# Patient Record
Sex: MALE | Race: WHITE | Employment: OTHER | URBAN - METROPOLITAN AREA
[De-identification: names, ages, dates, MRNs, and addresses within clinical notes are randomized per-mention and may not be internally consistent; named-entity substitution may affect disease eponyms.]

---

## 2018-02-15 ENCOUNTER — HOSPITAL ENCOUNTER (EMERGENCY)
Age: 43
Discharge: HOME | End: 2018-02-15
Attending: EMERGENCY MEDICINE
Payer: MEDICARE

## 2018-02-15 VITALS
TEMPERATURE: 97.7 F | DIASTOLIC BLOOD PRESSURE: 96 MMHG | SYSTOLIC BLOOD PRESSURE: 159 MMHG | OXYGEN SATURATION: 95 % | HEIGHT: 73 IN | WEIGHT: 167 LBS | RESPIRATION RATE: 18 BRPM | BODY MASS INDEX: 22.13 KG/M2 | HEART RATE: 74 BPM

## 2018-02-15 DIAGNOSIS — K04.7 DENTAL INFECTION: Primary | ICD-10-CM

## 2018-02-15 PROCEDURE — 99282 EMERGENCY DEPT VISIT SF MDM: CPT | Performed by: EMERGENCY MEDICINE

## 2018-02-15 RX ORDER — CLINDAMYCIN HYDROCHLORIDE 150 MG/1
300 CAPSULE ORAL 3 TIMES DAILY
Qty: 42 CAP | Refills: 0 | Status: SHIPPED | OUTPATIENT
Start: 2018-02-15 | End: 2019-07-16

## 2018-02-15 RX ORDER — IBUPROFEN 800 MG/1
800 TABLET ORAL
Qty: 20 TAB | Refills: 0 | Status: SHIPPED | OUTPATIENT
Start: 2018-02-15 | End: 2018-02-22

## 2018-02-15 RX ORDER — HYDROCODONE BITARTRATE AND ACETAMINOPHEN 7.5; 325 MG/1; MG/1
1 TABLET ORAL
Qty: 8 TAB | Refills: 0 | Status: SHIPPED | OUTPATIENT
Start: 2018-02-15 | End: 2019-07-16

## 2018-02-15 NOTE — DISCHARGE INSTRUCTIONS
Cool compresses to swollen, tender area. Take antibiotic until completed. Start both pain medications, Norco for only first 48 hours. Call dentist for recheck. Also consider calling family doctor for recheck. Abscessed Tooth: Care Instructions  Your Care Instructions    An abscessed tooth is a tooth that has a pocket of pus in the tissues around it. Pus forms when the body tries to fight an infection caused by bacteria. If the pus cannot drain, it forms an abscess. An abscessed tooth can cause red, swollen gums and throbbing pain, especially when you chew. You may have a bad taste in your mouth and a fever, and your jaw may swell. Damage to the tooth, untreated tooth decay, or gum disease can cause an abscessed tooth. An abscessed tooth needs to be treated by a dental professional right away. If it is not treated, the infection could spread to other parts of your body. Your dentist will give you antibiotics to stop the infection. He or she may make a hole in the tooth or cut open (izabel) the abscess inside your mouth so that the infection can drain, which should relieve your pain. You may need to have a root canal treatment, which tries to save your tooth by taking out the infected pulp and replacing it with a healing medicine and/or a filling. If these treatments do not work, your tooth may have to be removed. Follow-up care is a key part of your treatment and safety. Be sure to make and go to all appointments, and call your doctor if you are having problems. It's also a good idea to know your test results and keep a list of the medicines you take. How can you care for yourself at home? · Reduce pain and swelling in your face and jaw by putting ice or a cold pack on the outside of your cheek for 10 to 20 minutes at a time. Put a thin cloth between the ice and your skin. · Take pain medicines exactly as directed. ¨ If the doctor gave you a prescription medicine for pain, take it as prescribed.   ¨ If you are not taking a prescription pain medicine, ask your doctor if you can take an over-the-counter medicine. · Take your antibiotics as directed. Do not stop taking them just because you feel better. You need to take the full course of antibiotics. To prevent tooth abscess  · Brush and floss every day, and have regular dental checkups. · Eat a healthy diet, and avoid sugary foods and drinks. · Do not smoke, use e-cigarettes with nicotine, or use spit tobacco. Tobacco and nicotine slow your ability to heal. Tobacco also increases your risk for gum disease and cancer of the mouth and throat. If you need help quitting, talk to your doctor about stop-smoking programs and medicines. These can increase your chances of quitting for good. When should you call for help? Call 911 anytime you think you may need emergency care. For example, call if:  ? · You have trouble breathing. ?Call your doctor now or seek immediate medical care if:  ? · You have new or worse symptoms of infection, such as:  ¨ Increased pain, swelling, warmth, or redness. ¨ Red streaks leading from the area. ¨ Pus draining from the area. ¨ A fever. ? Watch closely for changes in your health, and be sure to contact your doctor if:  ? · You do not get better as expected. Where can you learn more? Go to http://tamara-dago.info/. Enter M667 in the search box to learn more about \"Abscessed Tooth: Care Instructions. \"  Current as of: May 12, 2017  Content Version: 11.4  © 6721-6904 [employer]. Care instructions adapted under license by [employer] (which disclaims liability or warranty for this information). If you have questions about a medical condition or this instruction, always ask your healthcare professional. [place] [address] any warranty or liability for your use of this information.

## 2018-02-15 NOTE — ED TRIAGE NOTES
Pt reports swelling to his right face after taking a remedy for \"pin worms\". Pt crying, agitated, with foul language in triage. Reports that he has been able to \"pass out\" at least 3 times while waiting to be triaged and was able to ambulate to the lobby window to inform registration of that each time.

## 2018-02-15 NOTE — ED PROVIDER NOTES
HPI Comments: 29-year-old male states he had a rash on his neck. Someone told him that a pinworm treatment would work for this. He took a dose of races pinworm treatment yesterday. He woke up with swelling on the right cheek this morning with tenderness and a headache. No fever, vomiting, photophobia, rash. He has no itching or hives. No swelling of throat shortness of breath or difficulty swallowing. No trauma. States he did have part of his tooth fall out yesterday. States that headache was the point where he felt like he was going pass out a number of times. Patient is a 38 y.o. male presenting with facial swelling. The history is provided by the patient. Facial Swelling    The incident occurred yesterday. The volume of blood lost was none. The quality of the pain is described as dull and throbbing. The pain is moderate. Pertinent negatives include no blurred vision, no vomiting and no weakness. Past Medical History:   Diagnosis Date    Chronic obstructive pulmonary disease ([facility] [address]     Endocrine disease     Ill-defined condition     fibromyalgia    Psychiatric disorder        Past Surgical History:   Procedure Laterality Date    HX HEENT      HX ORTHOPAEDIC           History reviewed. No pertinent family history. Social History     Social History    Marital status: SINGLE     Spouse name: N/A    Number of children: N/A    Years of education: N/A     Occupational History    Not on file. Social History Main Topics    Smoking status: Current Every Day Smoker     Packs/day: 0.50    Smokeless tobacco: Never Used    Alcohol use No    Drug use: No    Sexual activity: Not on file     Other Topics Concern    Not on file     Social History Narrative         ALLERGIES: Review of patient's allergies indicates no known allergies. Review of Systems   Constitutional: Negative for chills and fever. HENT: Positive for facial swelling.  Negative for postnasal drip, rhinorrhea, trouble swallowing and voice change. Eyes: Negative for blurred vision. Respiratory: Negative for cough and shortness of breath. Gastrointestinal: Negative for abdominal pain and vomiting. Skin: Negative for color change and rash. Neurological: Positive for dizziness and headaches. Negative for seizures, weakness and light-headedness. Vitals:    02/15/18 1345   BP: (!) 159/96   Pulse: 74   Resp: 18   Temp: 97.7 °F (36.5 °C)   SpO2: 95%   Weight: 75.8 kg (167 lb)   Height: 6' 1\" (1.854 m)            Physical Exam   Constitutional: He appears well-developed and well-nourished. No distress. HENT:   Head: Normocephalic and atraumatic. Right Ear: External ear normal.   Left Ear: External ear normal.   Mouth/Throat:       Neurological:   Normal speech and gait. Neck supple   Skin: Skin is warm and dry. Nursing note and vitals reviewed. MDM  Number of Diagnoses or Management Options  Diagnosis management comments: No evidence meningitis nor subarachnoid hemorrhage. Suspect syncope was vasovagal.  I believe the swelling is due to dental infection. Paresis pinworm treatment is pyrantel. Farris Sis next states that occasionally can cause headaches, pruritic rash.     Risk of Complications, Morbidity, and/or Mortality  Presenting problems: low  Diagnostic procedures: minimal  Management options: low    Patient Progress  Patient progress: stable        ED Course       Procedures

## 2018-02-15 NOTE — ED NOTES
I have reviewed discharge instructions with the patient. The patient verbalized understanding. Patient left ED via Discharge Method: ambulatory to Home with self  . Opportunity for questions and clarification provided. Patient given 3 scripts. To continue your aftercare when you leave the hospital, you may receive an automated call from our care team to check in on how you are doing. This is a free service and part of our promise to provide the best care and service to meet your aftercare needs.  If you have questions, or wish to unsubscribe from this service please call [phone]. Thank you for Choosing our [facility] Emergency Department.

## 2019-07-16 ENCOUNTER — HOSPITAL ENCOUNTER (EMERGENCY)
Age: 44
Discharge: HOME | End: 2019-07-16
Payer: MEDICARE

## 2019-07-16 VITALS
RESPIRATION RATE: 18 BRPM | DIASTOLIC BLOOD PRESSURE: 84 MMHG | HEART RATE: 94 BPM | BODY MASS INDEX: 23.09 KG/M2 | WEIGHT: 175 LBS | TEMPERATURE: 98 F | SYSTOLIC BLOOD PRESSURE: 125 MMHG | OXYGEN SATURATION: 92 %

## 2019-07-16 DIAGNOSIS — Z20.2 POSSIBLE EXPOSURE TO STD: Primary | ICD-10-CM

## 2019-07-16 LAB
BACTERIA URNS QL MICRO: 0 /HPF
CASTS URNS QL MICRO: ABNORMAL /LPF
CRYSTALS URNS QL MICRO: ABNORMAL /LPF
EPI CELLS #/AREA URNS HPF: 0 /HPF
MUCOUS THREADS URNS QL MICRO: ABNORMAL /LPF
RBC #/AREA URNS HPF: 0 /HPF
WBC URNS QL MICRO: >100 /HPF

## 2019-07-16 PROCEDURE — 99283 EMERGENCY DEPT VISIT LOW MDM: CPT

## 2019-07-16 PROCEDURE — 81003 URINALYSIS AUTO W/O SCOPE: CPT

## 2019-07-16 PROCEDURE — 74011250637 HC RX REV CODE- 250/637

## 2019-07-16 PROCEDURE — 96372 THER/PROPH/DIAG INJ SC/IM: CPT

## 2019-07-16 PROCEDURE — 81015 MICROSCOPIC EXAM OF URINE: CPT

## 2019-07-16 PROCEDURE — 74011250636 HC RX REV CODE- 250/636

## 2019-07-16 PROCEDURE — 87491 CHLMYD TRACH DNA AMP PROBE: CPT

## 2019-07-16 RX ORDER — AZITHROMYCIN 250 MG/1
1000 TABLET, FILM COATED ORAL
Status: COMPLETED | OUTPATIENT
Start: 2019-07-16 | End: 2019-07-16

## 2019-07-16 RX ORDER — SULFAMETHOXAZOLE AND TRIMETHOPRIM 800; 160 MG/1; MG/1
1 TABLET ORAL 2 TIMES DAILY
Qty: 14 TAB | Refills: 0 | Status: SHIPPED | OUTPATIENT
Start: 2019-07-16 | End: 2019-07-23

## 2019-07-16 RX ADMIN — AZITHROMYCIN MONOHYDRATE 1000 MG: 250 TABLET ORAL at 02:44

## 2019-07-16 RX ADMIN — LIDOCAINE HYDROCHLORIDE 250 MG: 10 INJECTION, SOLUTION INFILTRATION; PERINEURAL at 02:44

## 2019-07-16 NOTE — ED PROVIDER NOTES
61-year-old male complaining of pain with urination and discharge from his penis. Patient states that he had \"not so safe sex\" last week. The history is provided by the patient. Penile Discharge   This is a new problem. The problem occurs constantly. Primary symptoms include dysuria and penile discharge. The symptoms occur during urination. There has been no fever. Past Medical History:   Diagnosis Date    Chronic obstructive pulmonary disease ([facility] [address]     Endocrine disease     Ill-defined condition     fibromyalgia    Psychiatric disorder        Past Surgical History:   Procedure Laterality Date    HX HEENT      HX ORTHOPAEDIC           History reviewed. No pertinent family history.     Social History     Socioeconomic History    Marital status: SINGLE     Spouse name: Not on file    Number of children: Not on file    Years of education: Not on file    Highest education level: Not on file   Occupational History    Not on file   Social Needs    Financial resource strain: Not on file    Food insecurity:     Worry: Not on file     Inability: Not on file    Transportation needs:     Medical: Not on file     Non-medical: Not on file   Tobacco Use    Smoking status: Current Every Day Smoker     Packs/day: 0.50    Smokeless tobacco: Never Used   Substance and Sexual Activity    Alcohol use: No    Drug use: No    Sexual activity: Not on file   Lifestyle    Physical activity:     Days per week: Not on file     Minutes per session: Not on file    Stress: Not on file   Relationships    Social connections:     Talks on phone: Not on file     Gets together: Not on file     Attends Confucianism service: Not on file     Active member of club or organization: Not on file     Attends meetings of clubs or organizations: Not on file     Relationship status: Not on file    Intimate partner violence:     Fear of current or ex partner: Not on file     Emotionally abused: Not on file     Physically abused: Not on file     Forced sexual activity: Not on file   Other Topics Concern    Not on file   Social History Narrative    Not on file         ALLERGIES: Patient has no known allergies. Review of Systems   Constitutional: Negative. Negative for activity change. HENT: Negative. Eyes: Negative. Respiratory: Negative. Cardiovascular: Negative. Gastrointestinal: Negative. Genitourinary: Positive for dysuria and penile discharge. Musculoskeletal: Negative. Skin: Negative. Neurological: Negative. Psychiatric/Behavioral: Negative. All other systems reviewed and are negative. Vitals:    07/16/19 0054   BP: 125/84   Pulse: 94   Resp: 18   Temp: 98 °F (36.7 °C)   SpO2: 92%   Weight: 79.4 kg (175 lb)            Physical Exam   Constitutional: He is oriented to person, place, and time. He appears well-developed and well-nourished. No distress. HENT:   Head: Normocephalic and atraumatic. Right Ear: External ear normal.   Left Ear: External ear normal.   Nose: Nose normal.   Mouth/Throat: Oropharynx is clear and moist. No oropharyngeal exudate. Eyes: Pupils are equal, round, and reactive to light. Conjunctivae and EOM are normal. Right eye exhibits no discharge. Left eye exhibits no discharge. No scleral icterus. Neck: Normal range of motion. Neck supple. No JVD present. No tracheal deviation present. Cardiovascular: Normal rate and regular rhythm. Pulmonary/Chest: Effort normal. No stridor. No respiratory distress. He has no wheezes. He exhibits no tenderness. Abdominal: He exhibits no distension and no mass. There is no tenderness. Musculoskeletal: Normal range of motion. He exhibits no edema or tenderness. Neurological: He is alert and oriented to person, place, and time. No cranial nerve deficit. Skin: Skin is warm and dry. No rash noted. He is not diaphoretic. No erythema. No pallor. Psychiatric: He has a normal mood and affect.  His behavior is normal. Thought content normal.   Nursing note and vitals reviewed.        MDM  Number of Diagnoses or Management Options  Diagnosis management comments: Assessment: Symptoms of STD with treatment to cover for STD and UTI       Amount and/or Complexity of Data Reviewed  Clinical lab tests: ordered and reviewed  Tests in the medicine section of CPT®: ordered and reviewed           Procedures

## 2019-07-16 NOTE — DISCHARGE INSTRUCTIONS
Patient Education        Learning How to Use a Male Condom  What is a male condom? Condoms can be used to prevent pregnancy. They can also help protect against sexually transmitted infections (STIs). You must use a new condom every time you have sex. Condoms prevent pregnancy by keeping sperm and eggs apart. The condom holds the sperm so the sperm can't get into the vagina. A male condom is a tube of soft rubber or plastic with a closed end. It fits over the penis. There are many kinds of male condoms. Some condoms are lubricated. Some are ribbed. Most have a \"reservoir tip\" for holding the semen. You can also buy condoms of different sizes. How do you use a condom? Condoms work best if you follow these steps. · Use a new condom each time you have sex. · Check the condom's expiration date. Do not use it past that date. · When opening the condom wrapper, be sure not to poke a hole in the condom with your fingernails, teeth, or other sharp objects. · Put the condom on as soon as the penis is hard (erect) and before any sexual contact with your partner. ? First, hold the tip of the condom and squeeze out the air. This leaves room for the semen after you ejaculate. ? If you are not circumcised, pull down the loose skin from the head of the penis (foreskin) before you put on the condom. ? Hold on to the tip of the condom as you unroll the condom. Unroll it all the way down to the base of the penis. · After you ejaculate, hold on to the condom at the base of the penis, and withdraw from your partner while your penis is still erect. This will keep semen from spilling out of the condom. · Wash your hands after you handle a used condom. How do you buy and store condoms? · Male condoms may be available for free at family planning clinics. You can buy them without a prescription at drugstores, online, and in some grocery stores.   · Keep condoms wrapped in their original packages until you are ready to use them. Store them in a cool, dry place out of direct sunlight. · Don't keep rubber (latex) condoms in a glove compartment or other hot places for a long time. Heat weakens latex and increases the chance that the condom will break. · Don't use condoms in damaged packages. And don't use condoms that are brittle, sticky, or discolored, even if they are not past their expiration date. What else do you need to know? · To protect yourself and your partner from STIs, use a condom during vaginal, oral, or anal sex. · If the condom breaks or you think sperm may have leaked out into the vagina, the woman can use emergency contraception, such as the morning-after pill (Plan B). Emergency contraception can be used for up to 5 days after you have had sex. But it works best if you use it right away. · Use a male condom with another form of birth control. It's the best way to prevent pregnancy. ? You can use the condom with hormonal contraception, an intrauterine device (IUD), a diaphragm, a sponge, a shield, or a cervical cap. ? Don't use a male condom with a female condom. ? Use spermicide as its instructions say. Don't put spermicide inside a condom. · If you or your partner gets a rash or feels itchy after using a latex condom, talk to your doctor. You may have a latex allergy. Where can you learn more? Go to http://tamara-dago.info/. Enter R538 in the search box to learn more about \"Learning How to Use a Male Condom. \"  Current as of: September 5, 2018  Content Version: 11.9  © 1114-6085 [facility]. Care instructions adapted under license by [employer] (which disclaims liability or warranty for this information). If you have questions about a medical condition or this instruction, always ask your healthcare professional. [address] any warranty or liability for your use of this information.

## 2019-07-16 NOTE — LETTER
7/22/2019 Juana Wilson [address] [place] [id number] Dear Mr. Grazyna Cooley, You were recently seen in the Emergency Department of [facility] and had blood work or x-rays performed. We would like to discuss these with you. Please call the Emergency Department at your earliest convenience. If you were seen at [facility], please dial [phone], and if you were seen at [facility], please call [phone] to speak with one of our providers. Sincerely, Bert Gallo MD 
Medical Director Emergency Services, [address] [place] [address]  
[phone]/ [phone]

## 2019-07-19 LAB
C TRACH RRNA SPEC QL NAA+PROBE: NEGATIVE
N GONORRHOEA RRNA SPEC QL NAA+PROBE: POSITIVE
SPECIMEN SOURCE: ABNORMAL

## 2019-07-22 NOTE — PROGRESS NOTES
Jennifer Schwab   45 y.o.  [phone] (home)     Pt with positive gonorrhoeae cultures. ED TREATMENT:  Orders Placed This Encounter      CHLAMYDIA / GC-AMPLIFIED      URINE MICROSCOPIC      POC URINE DIPSTICK      cefTRIAXone (ROCEPHIN) 250 mg in lidocaine (XYLOCAINE) 10 mg/mL (1 %) IM syringe      azithromycin (ZITHROMAX) tablet 1,000 mg      trimethoprim-sulfamethoxazole (BACTRIM DS) 160-800 mg per tablet      Patient needs: notification  Patient called. Unable to reach patient. Letter mailed.      FENG Whitten; 7/22/2019 @9:57 AM===========================================

## 2021-09-20 PROBLEM — M25.50 POLYARTHRALGIA: Status: ACTIVE | Noted: 2021-09-20

## 2021-10-11 PROBLEM — F17.200 TOBACCO DEPENDENCE: Status: ACTIVE | Noted: 2021-10-11

## 2021-12-08 PROCEDURE — 88312 SPECIAL STAINS GROUP 1: CPT

## 2021-12-08 PROCEDURE — 88305 TISSUE EXAM BY PATHOLOGIST: CPT

## 2021-12-09 ENCOUNTER — HOSPITAL ENCOUNTER (OUTPATIENT)
Dept: LAB | Age: 46
Discharge: HOME | End: 2021-12-09

## 2022-03-10 ENCOUNTER — HOSPITAL ENCOUNTER (EMERGENCY)
Age: 47
Discharge: HOME | End: 2022-03-10
Attending: EMERGENCY MEDICINE
Payer: MEDICARE

## 2022-03-10 VITALS
HEIGHT: 73 IN | RESPIRATION RATE: 16 BRPM | DIASTOLIC BLOOD PRESSURE: 78 MMHG | SYSTOLIC BLOOD PRESSURE: 140 MMHG | TEMPERATURE: 98 F | OXYGEN SATURATION: 98 % | WEIGHT: 196 LBS | HEART RATE: 86 BPM | BODY MASS INDEX: 25.98 KG/M2

## 2022-03-10 DIAGNOSIS — N34.2 URETHRITIS: Primary | ICD-10-CM

## 2022-03-10 LAB
BACTERIA URNS QL MICRO: 0 /HPF
CASTS URNS QL MICRO: ABNORMAL /LPF
CRYSTALS URNS QL MICRO: ABNORMAL /LPF
EPI CELLS #/AREA URNS HPF: ABNORMAL /HPF
MUCOUS THREADS URNS QL MICRO: ABNORMAL /LPF
OTHER OBSERVATIONS,UCOM: ABNORMAL
RBC #/AREA URNS HPF: ABNORMAL /HPF
WBC URNS QL MICRO: ABNORMAL /HPF

## 2022-03-10 PROCEDURE — 74011250637 HC RX REV CODE- 250/637: Performed by: EMERGENCY MEDICINE

## 2022-03-10 PROCEDURE — 87491 CHLMYD TRACH DNA AMP PROBE: CPT

## 2022-03-10 PROCEDURE — 96372 THER/PROPH/DIAG INJ SC/IM: CPT

## 2022-03-10 PROCEDURE — 74011000250 HC RX REV CODE- 250: Performed by: EMERGENCY MEDICINE

## 2022-03-10 PROCEDURE — 74011250636 HC RX REV CODE- 250/636: Performed by: EMERGENCY MEDICINE

## 2022-03-10 PROCEDURE — 99284 EMERGENCY DEPT VISIT MOD MDM: CPT

## 2022-03-10 PROCEDURE — 81015 MICROSCOPIC EXAM OF URINE: CPT

## 2022-03-10 PROCEDURE — 81003 URINALYSIS AUTO W/O SCOPE: CPT

## 2022-03-10 RX ORDER — PHENAZOPYRIDINE HYDROCHLORIDE 200 MG/1
200 TABLET, FILM COATED ORAL 3 TIMES DAILY
Qty: 6 TABLET | Refills: 0 | Status: SHIPPED | OUTPATIENT
Start: 2022-03-10 | End: 2022-03-12

## 2022-03-10 RX ORDER — DOXYCYCLINE 100 MG/1
100 CAPSULE ORAL
Status: COMPLETED | OUTPATIENT
Start: 2022-03-10 | End: 2022-03-10

## 2022-03-10 RX ORDER — DOXYCYCLINE HYCLATE 100 MG
100 TABLET ORAL 2 TIMES DAILY
Qty: 20 TABLET | Refills: 0 | Status: SHIPPED | OUTPATIENT
Start: 2022-03-10 | End: 2022-03-20

## 2022-03-10 RX ADMIN — DOXYCYCLINE HYCLATE 100 MG: 100 CAPSULE ORAL at 06:11

## 2022-03-10 RX ADMIN — LIDOCAINE HYDROCHLORIDE 500 MG: 10 INJECTION, SOLUTION INFILTRATION; PERINEURAL at 06:14

## 2022-03-10 NOTE — ED NOTES
I have reviewed discharge instructions with the patient. The patient verbalized understanding. Patient left ED via Discharge Method: ambulatory to Home with self. Opportunity for questions and clarification provided. Patient given 2 scripts. No e-sign. To continue your aftercare when you leave the hospital, you may receive an automated call from our care team to check in on how you are doing. This is a free service and part of our promise to provide the best care and service to meet your aftercare needs.  If you have questions, or wish to unsubscribe from this service please call [phone]. Thank you for Choosing our [facility] Emergency Department.

## 2022-03-10 NOTE — ED TRIAGE NOTES
Pt states he has experienced burning upon urination and a foul odor X 1 week and states he thinks he has a STD.

## 2022-03-10 NOTE — DISCHARGE INSTRUCTIONS
You must finish all the prescribed antibiotics. THERE SHOULD BE NO LEFT OVER PILLS!!   Drink plenty of fluids  Call your doctor for close follow-up visit    Return to ER for any worsening symptoms or new problems which may arise

## 2022-03-10 NOTE — ED PROVIDER NOTES
The history is provided by the patient. Urinary Pain   This is a new problem. The current episode started more than 2 days ago. The problem occurs intermittently. The problem has been gradually worsening. The quality of the pain is described as burning. The pain is moderate. There has been no fever. Associated symptoms include chills and urgency. Pertinent negatives include no sweats, no nausea, no vomiting, no flank pain, no penile discharge, no abdominal pain and no back pain. He has tried nothing for the symptoms. His past medical history is significant for urinary stasis.         Past Medical History:   Diagnosis Date    Anxiety     Bell's palsy     Chronic obstructive pulmonary disease (HCC)     Depression     Diverticulosis     Fatigue     GERD (gastroesophageal reflux disease)     Gonorrhea 07/2019    Hemorrhoids     IBS (irritable bowel syndrome)     Ill-defined condition     fibromyalgia    Left ACL tear     Pre-diabetes     Psychiatric disorder     SLAP tear of shoulder     Thyroid disease     s/p partial thyroidectomy for goiter    Tobacco dependence 10/11/2021    Tumors     Vision changes        Past Surgical History:   Procedure Laterality Date    HX ACL RECONSTRUCTION      L shoulder    HX HEENT      HX ORTHOPAEDIC      L shoulder     HX TENDON / LIGAMENT TRANSPLANT      IR BX THYROID NEEDLE CORE  2007    HAS HALF THYROID         Family History:   Problem Relation Age of Onset    Hypertension Mother     Cancer Mother     Kidney Disease Mother     Anxiety Mother     Depression Mother     Heart Disease Mother     No Known Problems Father     Cancer Brother         VARIOUS TUMORS    Diabetes Brother        Social History     Socioeconomic History    Marital status: SINGLE     Spouse name: Not on file    Number of children: Not on file    Years of education: Not on file    Highest education level: Not on file   Occupational History    Not on file   Tobacco Use    Smoking status: Current Every Day Smoker     Packs/day: 0.50    Smokeless tobacco: Never Used   Vaping Use    Vaping Use: Some days   Substance and Sexual Activity    Alcohol use: Not Currently    Drug use: No    Sexual activity: Not on file   Other Topics Concern    Not on file   Social History Narrative    Not on file     Social Determinants of Health     Financial Resource Strain:     Difficulty of Paying Living Expenses: Not on file   Food Insecurity:     Worried About Running Out of Food in the Last Year: Not on file    Eddie of Food in the Last Year: Not on file   Transportation Needs:     Lack of Transportation (Medical): Not on file    Lack of Transportation (Non-Medical): Not on file   Physical Activity:     Days of Exercise per Week: Not on file    Minutes of Exercise per Session: Not on file   Stress:     Feeling of Stress : Not on file   Social Connections:     Frequency of Communication with Friends and Family: Not on file    Frequency of Social Gatherings with Friends and Family: Not on file    Attends [employer] Services: Not on file    Active Member of [address] [place] [address] or Organizations: Not on file    Attends Club or Organization Meetings: Not on file    Marital Status: Not on file   Intimate Partner Violence:     Fear of Current or Ex-Partner: Not on file    Emotionally Abused: Not on file    Physically Abused: Not on file    Sexually Abused: Not on file   Housing Stability:     Unable to Pay for Housing in the Last Year: Not on file    Number of [place] in the Last Year: Not on file    Unstable Housing in the Last Year: Not on file         ALLERGIES: [place] [levonorgestrel-ethinyl estrad]    Review of Systems   Constitutional: Positive for chills. Negative for activity change, diaphoresis and fever. HENT: Negative for dental problem, hearing loss, nosebleeds, rhinorrhea and sore throat. Eyes: Negative for pain, discharge, redness and visual disturbance.    Respiratory: Negative for cough, chest tightness and shortness of breath. Cardiovascular: Negative for chest pain, palpitations and leg swelling. Gastrointestinal: Negative for abdominal pain, constipation, diarrhea, nausea and vomiting. Endocrine: Negative for cold intolerance, heat intolerance, polydipsia and polyuria. Genitourinary: Positive for dysuria and urgency. Negative for flank pain and penile discharge. Musculoskeletal: Negative for arthralgias, back pain, joint swelling, myalgias and neck pain. Skin: Negative for pallor and rash. Allergic/Immunologic: Negative for environmental allergies and food allergies. Neurological: Negative for dizziness, tremors, light-headedness, numbness and headaches. Hematological: Negative for adenopathy. Does not bruise/bleed easily. Psychiatric/Behavioral: Negative for confusion and dysphoric mood. The patient is not nervous/anxious and is not hyperactive. All other systems reviewed and are negative. Vitals:    03/10/22 0550 03/10/22 0553   BP:  (!) 145/91   Pulse: 95    Resp: 16    Temp: 98.1 °F (36.7 °C)    SpO2: 96%    Weight: 88.9 kg (196 lb)    Height: 6' 1\" (1.854 m)             Physical Exam  Vitals and nursing note reviewed. Constitutional:       General: He is in acute distress. Appearance: Normal appearance. He is well-developed and normal weight. HENT:      Head: Normocephalic and atraumatic. Mouth/Throat:      Mouth: Mucous membranes are moist.      Pharynx: Oropharynx is clear. No oropharyngeal exudate or posterior oropharyngeal erythema. Eyes:      General: No scleral icterus. Extraocular Movements: Extraocular movements intact. Conjunctiva/sclera: Conjunctivae normal.      Pupils: Pupils are equal, round, and reactive to light. Neck:      Thyroid: No thyromegaly. Vascular: No JVD. Cardiovascular:      Rate and Rhythm: Normal rate and regular rhythm. Pulses: Normal pulses.       Heart sounds: Normal heart sounds. No murmur heard. No friction rub. No gallop. Pulmonary:      Effort: Pulmonary effort is normal. No respiratory distress. Breath sounds: Normal breath sounds. No wheezing. Abdominal:      General: Bowel sounds are normal. There is no distension. Palpations: Abdomen is soft. Tenderness: There is no right CVA tenderness or left CVA tenderness. Musculoskeletal:         General: No tenderness or deformity. Normal range of motion. Cervical back: Normal range of motion and neck supple. Skin:     General: Skin is warm and dry. Capillary Refill: Capillary refill takes less than 2 seconds. Findings: No rash. Neurological:      Mental Status: He is alert and oriented to person, place, and time. Cranial Nerves: No cranial nerve deficit. Sensory: No sensory deficit. Motor: No abnormal muscle tone. Coordination: Coordination normal.   Psychiatric:         Mood and Affect: Mood normal.         Behavior: Behavior normal.         Thought Content: Thought content normal.         Judgment: Judgment normal.          MDM  Number of Diagnoses or Management Options  Urethritis: new and requires workup  Diagnosis management comments: Urine dip negative for infection or blood    Will cover for possible STD exposure, Rocephin and doxy    Urine GC chlamydia sent    Advise close follow-up with his primary care       Amount and/or Complexity of Data Reviewed  Clinical lab tests: ordered and reviewed  Tests in the medicine section of CPT®: ordered and reviewed  Decide to obtain previous medical records or to obtain history from someone other than the patient: yes  Review and summarize past medical records: yes    Risk of Complications, Morbidity, and/or Mortality  Presenting problems: moderate  Diagnostic procedures: moderate  Management options: moderate  General comments: Elements of this note have been dictated via voice recognition software.   Text and phrases may be limited by the accuracy of the software. The chart has been reviewed, but errors may still be present.       Patient Progress  Patient progress: improved         Procedures

## 2022-03-12 LAB
C TRACH RRNA SPEC QL NAA+PROBE: NEGATIVE
N GONORRHOEA RRNA SPEC QL NAA+PROBE: NEGATIVE
SPECIMEN SOURCE: NORMAL

## 2022-03-17 ENCOUNTER — HOSPITAL ENCOUNTER (OUTPATIENT)
Dept: GENERAL RADIOLOGY | Age: 47
Discharge: HOME | End: 2022-03-17
Attending: INTERNAL MEDICINE
Payer: MEDICARE

## 2022-03-17 DIAGNOSIS — M25.50 POLYARTHRALGIA: ICD-10-CM

## 2022-03-17 PROCEDURE — 72202 X-RAY EXAM SI JOINTS 3/> VWS: CPT

## 2022-03-19 PROBLEM — F17.200 TOBACCO DEPENDENCE: Status: ACTIVE | Noted: 2021-10-11

## 2022-03-19 PROBLEM — M25.50 POLYARTHRALGIA: Status: ACTIVE | Noted: 2021-09-20

## 2022-04-22 PROBLEM — R82.90 ABNORMAL URINE ODOR: Status: ACTIVE | Noted: 2022-04-22

## 2022-04-22 PROBLEM — N39.41 URGENCY INCONTINENCE: Status: ACTIVE | Noted: 2022-04-22

## 2022-04-22 PROBLEM — R82.90 CLOUDY URINE: Status: ACTIVE | Noted: 2022-04-22

## 2022-06-03 ENCOUNTER — OFFICE VISIT (OUTPATIENT)
Dept: INTERNAL MEDICINE CLINIC | Facility: CLINIC | Age: 47
End: 2022-06-03
Payer: MEDICARE

## 2022-06-03 VITALS
WEIGHT: 193.4 LBS | HEART RATE: 83 BPM | TEMPERATURE: 98.7 F | BODY MASS INDEX: 25.63 KG/M2 | HEIGHT: 73 IN | DIASTOLIC BLOOD PRESSURE: 83 MMHG | SYSTOLIC BLOOD PRESSURE: 117 MMHG | OXYGEN SATURATION: 90 %

## 2022-06-03 DIAGNOSIS — N39.41 URGENCY INCONTINENCE: ICD-10-CM

## 2022-06-03 DIAGNOSIS — R39.15 URGENCY OF URINATION: ICD-10-CM

## 2022-06-03 DIAGNOSIS — F41.9 ANXIETY: Primary | ICD-10-CM

## 2022-06-03 DIAGNOSIS — M25.50 POLYARTHRALGIA: ICD-10-CM

## 2022-06-03 DIAGNOSIS — N50.811 PAIN IN RIGHT TESTICLE: ICD-10-CM

## 2022-06-03 DIAGNOSIS — E04.2 MULTIPLE THYROID NODULES: ICD-10-CM

## 2022-06-03 DIAGNOSIS — K21.9 GASTROESOPHAGEAL REFLUX DISEASE WITHOUT ESOPHAGITIS: ICD-10-CM

## 2022-06-03 DIAGNOSIS — N52.9 ERECTILE DYSFUNCTION, UNSPECIFIED ERECTILE DYSFUNCTION TYPE: ICD-10-CM

## 2022-06-03 DIAGNOSIS — R73.03 PREDIABETES: ICD-10-CM

## 2022-06-03 PROBLEM — R82.90 ABNORMAL URINE ODOR: Status: RESOLVED | Noted: 2022-04-22 | Resolved: 2022-06-03

## 2022-06-03 PROBLEM — R82.90 CLOUDY URINE: Status: RESOLVED | Noted: 2022-04-22 | Resolved: 2022-06-03

## 2022-06-03 LAB — PSA SERPL-MCNC: 0.6 NG/ML

## 2022-06-03 PROCEDURE — 99214 OFFICE O/P EST MOD 30 MIN: CPT | Performed by: INTERNAL MEDICINE

## 2022-06-03 RX ORDER — OMEPRAZOLE 40 MG/1
40 CAPSULE, DELAYED RELEASE ORAL DAILY
Qty: 30 CAPSULE | Refills: 2 | Status: SHIPPED | OUTPATIENT
Start: 2022-06-03

## 2022-06-03 RX ORDER — TADALAFIL 10 MG/1
10 TABLET ORAL PRN
Qty: 30 TABLET | Refills: 2 | Status: SHIPPED | OUTPATIENT
Start: 2022-06-03 | End: 2022-07-03

## 2022-06-03 RX ORDER — CITALOPRAM 10 MG/1
10 TABLET ORAL DAILY
COMMUNITY
Start: 2022-04-22 | End: 2022-07-21

## 2022-06-03 ASSESSMENT — PATIENT HEALTH QUESTIONNAIRE - PHQ9
SUM OF ALL RESPONSES TO PHQ QUESTIONS 1-9: 0
1. LITTLE INTEREST OR PLEASURE IN DOING THINGS: 0
2. FEELING DOWN, DEPRESSED OR HOPELESS: 0
SUM OF ALL RESPONSES TO PHQ9 QUESTIONS 1 & 2: 0

## 2022-06-03 ASSESSMENT — ENCOUNTER SYMPTOMS
EYES NEGATIVE: 1
GASTROINTESTINAL NEGATIVE: 1
RESPIRATORY NEGATIVE: 1

## 2022-06-03 NOTE — PROGRESS NOTES
Polly Henley D.O. [facility]  Odalis Diadema [address] [place] [address]  Tel: [phone]    Office Visit: Follow Up     Patient Name: Rico Hughes   :  1975   MRN:   [id number]      Today's Date: 22 8:05 AM    Subjective     The patient is a 41y.o.-year-old male who presents for follow-up. He states he never started his Celexa due reading information about side effects and withdrawal. He states he only started 2 days of varenicline, but will start that soon. Today: He states he is taking OTC erectile dysfunction pills and he has since noticed that he has upset stomach after this. He is requesting a prescription for Cialis. He states he has no new complaints today. Review of Systems   Constitutional: Positive for fatigue. HENT: Negative. Eyes: Negative. Respiratory: Negative. Cardiovascular: Negative. Gastrointestinal: Negative. GERD   Endocrine: Negative. Genitourinary: Positive for frequency, testicular pain and urgency. Musculoskeletal: Positive for arthralgias. Skin: Negative. Neurological: Negative. Psychiatric/Behavioral: The patient is nervous/anxious.        Past Medical History:   Diagnosis Date    Anxiety     Bell's palsy     Chronic obstructive pulmonary disease (HCC)     Depression     Diverticulosis     Fatigue     GERD (gastroesophageal reflux disease)     Gonorrhea 2019    Hemorrhoids     IBS (irritable bowel syndrome)     Ill-defined condition     fibromyalgia    Left ACL tear     Pre-diabetes     Psychiatric disorder     SLAP tear of shoulder     Thyroid disease     s/p partial thyroidectomy for goiter    Tobacco dependence 10/11/2021    Tumors     Vision changes      Social History     Socioeconomic History    Marital status: Single     Spouse name: Not on file    Number of children: Not on file    Years of education: Not on file    Highest education level: Not on file   Occupational History    Not on file   Tobacco Use    Smoking status: Current Every Day Smoker     Packs/day: 0.50    Smokeless tobacco: Never Used   Substance and Sexual Activity    Alcohol use: Not Currently    Drug use: No    Sexual activity: Not on file   Other Topics Concern    Not on file   Social History Narrative    Not on file     Social Determinants of Health     Financial Resource Strain:     Difficulty of Paying Living Expenses: Not on file   Food Insecurity:     Worried About Running Out of Food in the Last Year: Not on file    [place] of Food in the Last Year: Not on file   Transportation Needs:     Lack of Transportation (Medical): Not on file    Lack of Transportation (Non-Medical):  Not on file   Physical Activity:     Days of Exercise per Week: Not on file    Minutes of Exercise per Session: Not on file   Stress:     Feeling of Stress : Not on file   Social Connections:     Frequency of Communication with Friends and Family: Not on file    Frequency of Social Gatherings with Friends and Family: Not on file    Attends [employer] Services: Not on file    Active Member of [address] [place] [address] or Organizations: Not on file    Attends Club or Organization Meetings: Not on file    Marital Status: Not on file   Intimate Partner Violence:     Fear of Current or Ex-Partner: Not on file    Emotionally Abused: Not on file    Physically Abused: Not on file    Sexually Abused: Not on file   Housing Stability:     Unable to Pay for Housing in the Last Year: Not on file    Number of [place] in the Last Year: Not on file    Unstable Housing in the Last Year: Not on file         Current Outpatient Medications   Medication Sig    citalopram (CELEXA) 10 MG tablet Take 10 mg by mouth daily    albuterol sulfate  (90 Base) MCG/ACT inhaler INHALE 1 PUFF BY MOUTH AS NEEDED FOR WHEEZING OR SHORTNESS OF BREATH    aspirin 325 MG tablet Take 325 mg by mouth daily    dicyclomine (BENTYL) 10 MG capsule Take 10 mg by mouth 4 times daily (before meals and nightly)    omeprazole (PRILOSEC) 40 MG delayed release capsule Take 40 mg by mouth daily     No current facility-administered medications for this visit. Objective     Vitals:    06/03/22 0802   BP: 117/83   Site: Right Upper Arm   Position: Sitting   Cuff Size: Large Adult   Pulse: 83   Temp: 98.7 °F (37.1 °C)   TempSrc: Temporal   SpO2: 90%   Weight: 193 lb 6.4 oz (87.7 kg)   Height: 6' 1\" (1.854 m)      Physical Exam:  Constitutional: Appears well kempt. Alert/oriented x3. In no acute distress. Head: Normocephalic No trauma. No deformity. No bruits. Neck: Supple. ROM normal. No tenderness. No masses. Cardiac: Heart with normal rate/rhythm. No murmurs. No gallops. Pulses normal.   Pulmonary: Lungs clear to auscultation bilaterally. In no respiratory distress. No wheezing. No rales. No rhonci. Psychiatric: Normal thought content. Normal behavior. Normal judgment. Recommendations     Assessment:  Patient Active Problem List   Diagnosis    Diverticulosis    Vision changes    Polyarthralgia    Anxiety    GERD (gastroesophageal reflux disease)    Chronic obstructive pulmonary disease (HCC)    Tobacco dependence    Urgency incontinence    Abnormal urine odor    Cloudy urine      Plan:  -Patient will trial Celexa for 4 weeks, we will follow-up in 4 weeks, consider psychiatry referral if patient is amenable  -Refill Omeprazole  -thyroid ultrasound, patient was told at one point a few years ago that he had 7 thyroid nodules and hasn't had them monitor in years due to insurance  -start Cialis   -check PSA, per patient request  -referral to diabetic education   -rheumatology referral  -continue remainder of treatment regimen as is   -The patient expresses understanding of the plan as I've explained it to him and is in agreement with the current plan.     Follow Up: 4 weeks    Time Spent in Patient Room: 15 minutes  Time Spent Pre- and Post Reviewing patient's chart: 15 minutes  Total Time: 30 minutes    Signed: Felicia Agudelo D.O.  06/03/22  8:05 AM

## 2022-06-15 ENCOUNTER — FOLLOWUP TELEPHONE ENCOUNTER (OUTPATIENT)
Dept: DIABETES SERVICES | Age: 47
End: 2022-06-15

## 2022-06-15 NOTE — TELEPHONE ENCOUNTER
Referral received for prediabetes. Pt has [facility]. Called and left message asking for a return call.

## 2022-06-21 ENCOUNTER — TELEPHONE (OUTPATIENT)
Dept: DIABETES SERVICES | Age: 47
End: 2022-06-21

## 2022-06-28 ENCOUNTER — FOLLOWUP TELEPHONE ENCOUNTER (OUTPATIENT)
Dept: DIABETES SERVICES | Age: 47
End: 2022-06-28

## 2022-06-28 NOTE — TELEPHONE ENCOUNTER
Third attempt to contact pt regarding referral for prediabetes. Pt has [employer]. Left message asking for a return call. If we don't hear from pt by 7/5/22 we will send a letter. If we don't hear from pt by 7/19/22 we will close.

## 2022-06-30 ENCOUNTER — TELEPHONE (OUTPATIENT)
Dept: INTERNAL MEDICINE CLINIC | Facility: CLINIC | Age: 47
End: 2022-06-30

## 2022-06-30 DIAGNOSIS — S02.5XXA CLOSED FRACTURE OF TOOTH, INITIAL ENCOUNTER: Primary | ICD-10-CM

## 2022-06-30 RX ORDER — AMOXICILLIN 500 MG/1
500 CAPSULE ORAL 2 TIMES DAILY
Qty: 14 CAPSULE | Refills: 0 | Status: SHIPPED | OUTPATIENT
Start: 2022-06-30 | End: 2022-07-07

## 2022-06-30 NOTE — TELEPHONE ENCOUNTER
[address] called from [address] [place] [address],[address] stating that the patient is needing to know if there can be antibiotics sent in for the patient due to a cracked tooth. Does patient need to be seen?

## 2022-06-30 NOTE — TELEPHONE ENCOUNTER
I have called the patient this afternoon to let him know that he needed to contact is dentist. He stated that he did not have one and that he has contacted two but it was too late in the day to be seen since it would be a consult.  He thanked me for calling and for letting him but stated that Alireza Fabian was unhelpful as usual.

## 2022-07-19 ENCOUNTER — TELEPHONE (OUTPATIENT)
Dept: INTERNAL MEDICINE CLINIC | Facility: CLINIC | Age: 47
End: 2022-07-19

## 2022-07-19 NOTE — TELEPHONE ENCOUNTER
Received fax on behalf of patient from [address] [place] [address] notifying our office that they have attempted several times to contact the patient in regards to the referral that was placed. The patient will need to contact their office, if the patient would like to proceed with the referral.    The referral has been closed by [address] [place] [address].

## 2022-10-07 ENCOUNTER — HOSPITAL ENCOUNTER (EMERGENCY)
Age: 47
Discharge: HOME | End: 2022-10-07
Attending: EMERGENCY MEDICINE
Payer: MEDICARE

## 2022-10-07 VITALS
HEIGHT: 73 IN | OXYGEN SATURATION: 94 % | DIASTOLIC BLOOD PRESSURE: 92 MMHG | HEART RATE: 89 BPM | RESPIRATION RATE: 18 BRPM | TEMPERATURE: 97.7 F | BODY MASS INDEX: 26.51 KG/M2 | SYSTOLIC BLOOD PRESSURE: 143 MMHG | WEIGHT: 200 LBS

## 2022-10-07 DIAGNOSIS — K62.5 RECTAL BLEEDING: Primary | ICD-10-CM

## 2022-10-07 LAB
ALBUMIN SERPL-MCNC: 3.8 G/DL (ref 3.5–5)
ALBUMIN/GLOB SERPL: 0.9 {RATIO} (ref 1.2–3.5)
ALP SERPL-CCNC: 153 U/L (ref 50–136)
ALT SERPL-CCNC: 34 U/L (ref 12–65)
ANION GAP SERPL CALC-SCNC: 4 MMOL/L (ref 4–13)
AST SERPL-CCNC: 16 U/L (ref 15–37)
BASOPHILS # BLD: 0.1 K/UL (ref 0–0.2)
BASOPHILS NFR BLD: 1 % (ref 0–2)
BILIRUB SERPL-MCNC: 0.6 MG/DL (ref 0.2–1.1)
BUN SERPL-MCNC: 16 MG/DL (ref 6–23)
CALCIUM SERPL-MCNC: 9.1 MG/DL (ref 8.3–10.4)
CHLORIDE SERPL-SCNC: 106 MMOL/L (ref 101–110)
CO2 SERPL-SCNC: 27 MMOL/L (ref 21–32)
CREAT SERPL-MCNC: 1.36 MG/DL (ref 0.8–1.5)
DIFFERENTIAL METHOD BLD: NORMAL
EOSINOPHIL # BLD: 0.5 K/UL (ref 0–0.8)
EOSINOPHIL NFR BLD: 5 % (ref 0.5–7.8)
ERYTHROCYTE [DISTWIDTH] IN BLOOD BY AUTOMATED COUNT: 13.2 % (ref 11.9–14.6)
GLOBULIN SER CALC-MCNC: 4.2 G/DL (ref 2.3–3.5)
GLUCOSE SERPL-MCNC: 92 MG/DL (ref 65–100)
HCT VFR BLD AUTO: 47.8 % (ref 41.1–50.3)
HGB BLD-MCNC: 15.4 G/DL (ref 13.6–17.2)
IMM GRANULOCYTES # BLD AUTO: 0 K/UL (ref 0–0.5)
IMM GRANULOCYTES NFR BLD AUTO: 0 % (ref 0–5)
LYMPHOCYTES # BLD: 3.8 K/UL (ref 0.5–4.6)
LYMPHOCYTES NFR BLD: 35 % (ref 13–44)
MCH RBC QN AUTO: 29.8 PG (ref 26.1–32.9)
MCHC RBC AUTO-ENTMCNC: 32.2 G/DL (ref 31.4–35)
MCV RBC AUTO: 92.5 FL (ref 79.6–97.8)
MONOCYTES # BLD: 0.9 K/UL (ref 0.1–1.3)
MONOCYTES NFR BLD: 9 % (ref 4–12)
NEUTS SEG # BLD: 5.4 K/UL (ref 1.7–8.2)
NEUTS SEG NFR BLD: 50 % (ref 43–78)
NRBC # BLD: 0 K/UL (ref 0–0.2)
PLATELET # BLD AUTO: 391 K/UL (ref 150–450)
PMV BLD AUTO: 10.4 FL (ref 9.4–12.3)
POTASSIUM SERPL-SCNC: 4.4 MMOL/L (ref 3.5–5.1)
PROT SERPL-MCNC: 8 G/DL (ref 6.3–8.2)
RBC # BLD AUTO: 5.17 M/UL (ref 4.23–5.6)
SODIUM SERPL-SCNC: 137 MMOL/L (ref 136–145)
WBC # BLD AUTO: 10.8 K/UL (ref 4.3–11.1)

## 2022-10-07 PROCEDURE — 85025 COMPLETE CBC W/AUTO DIFF WBC: CPT

## 2022-10-07 PROCEDURE — A4216 STERILE WATER/SALINE, 10 ML: HCPCS | Performed by: EMERGENCY MEDICINE

## 2022-10-07 PROCEDURE — 2580000003 HC RX 258: Performed by: EMERGENCY MEDICINE

## 2022-10-07 PROCEDURE — 6360000002 HC RX W HCPCS: Performed by: EMERGENCY MEDICINE

## 2022-10-07 PROCEDURE — 99284 EMERGENCY DEPT VISIT MOD MDM: CPT

## 2022-10-07 PROCEDURE — 96374 THER/PROPH/DIAG INJ IV PUSH: CPT

## 2022-10-07 PROCEDURE — C9113 INJ PANTOPRAZOLE SODIUM, VIA: HCPCS | Performed by: EMERGENCY MEDICINE

## 2022-10-07 PROCEDURE — 80053 COMPREHEN METABOLIC PANEL: CPT

## 2022-10-07 RX ORDER — FAMOTIDINE 40 MG/1
20 TABLET, FILM COATED ORAL 2 TIMES DAILY
Qty: 20 TABLET | Refills: 0 | Status: SHIPPED | OUTPATIENT
Start: 2022-10-07 | End: 2022-10-27

## 2022-10-07 RX ADMIN — SODIUM CHLORIDE 40 MG: 9 INJECTION, SOLUTION INTRAMUSCULAR; INTRAVENOUS; SUBCUTANEOUS at 19:46

## 2022-10-07 ASSESSMENT — ENCOUNTER SYMPTOMS
NAUSEA: 0
SHORTNESS OF BREATH: 0
COUGH: 0
HEMATOCHEZIA: 1
CONSTIPATION: 0
BACK PAIN: 0
SORE THROAT: 0
RHINORRHEA: 0
COLOR CHANGE: 0
ABDOMINAL PAIN: 0
DIARRHEA: 0
BLOOD IN STOOL: 1
VOMITING: 0

## 2022-10-07 ASSESSMENT — PAIN DESCRIPTION - PAIN TYPE: TYPE: ACUTE PAIN

## 2022-10-07 ASSESSMENT — PAIN - FUNCTIONAL ASSESSMENT: PAIN_FUNCTIONAL_ASSESSMENT: 0-10

## 2022-10-07 ASSESSMENT — PAIN SCALES - GENERAL: PAINLEVEL_OUTOF10: 1

## 2022-10-07 NOTE — ED TRIAGE NOTES
Patient ambulatory to triage with c/o blood in stool for the past 5 days. Patient states he had a colonoscopy recently that showed 9 polyps as well as currently having hemorrhoids. Patient states he isn't sure if either of these is what is causing his problem.

## 2022-10-07 NOTE — ED PROVIDER NOTES
Emergency Department Provider Note                   PCP:                Angelique Taylor DO               Age: 41 y.o. Sex: male       ICD-10-CM    1. Rectal bleeding  K62.5           DISPOSITION Decision To Discharge 10/07/2022 08:04:46 PM       MDM  Number of Diagnoses or Management Options  Diagnosis management comments: Patient with 5 days of rectal bleeding. Hemoglobin 15. No fatigue shortness of breath or lightheadedness. Will discharge with GI follow-up. Amount and/or Complexity of Data Reviewed  Clinical lab tests: ordered and reviewed    Patient Progress  Patient progress: stable             Orders Placed This Encounter   Procedures    CBC with Auto Differential    Comprehensive Metabolic Panel    Saline lock IV        Medications   pantoprazole (PROTONIX) 40 mg in sodium chloride (PF) 10 mL injection (40 mg IntraVENous Given 10/7/22 1946)       New Prescriptions    ESOMEPRAZOLE (NEXIUM) 20 MG DELAYED RELEASE CAPSULE    Take 1 capsule by mouth every morning (before breakfast)    FAMOTIDINE (PEPCID) 40 MG TABLET    Take 0.5 tablets by mouth 2 times daily for 20 days        Christin Swenson is a 41 y.o. male who presents to the Emergency Department with chief complaint of    Chief Complaint   Patient presents with    Rectal Bleeding      Patient for the past 5 days has had some blood in his stool. Has had similar episodes in the past but normally resolves in a couple days. Denies any fatigue or weakness. No chest pain or shortness of breath. No abdominal pain. No rectal pain. The history is provided by the patient. No  was used. Rectal Bleeding  Quality:  Bright red  Amount:   Moderate  Duration:  5 days  Timing:  Constant  Chronicity:  Recurrent  Similar prior episodes: yes    Relieved by:  Nothing  Worsened by:  Nothing  Associated symptoms: no abdominal pain, no dizziness, no fever, no light-headedness and no vomiting    Risk factors: hx of IBD    Risk factors: no anticoagulant use      All other systems reviewed and are negative unless otherwise stated in the history of present illness section. Review of Systems   Constitutional:  Negative for chills and fever. HENT:  Negative for rhinorrhea and sore throat. Respiratory:  Negative for cough and shortness of breath. Cardiovascular:  Negative for chest pain and palpitations. Gastrointestinal:  Positive for blood in stool and hematochezia. Negative for abdominal pain, constipation, diarrhea, nausea and vomiting. Genitourinary:  Negative for dysuria and hematuria. Musculoskeletal:  Negative for back pain and neck pain. Skin:  Negative for color change and rash. Neurological:  Negative for dizziness, light-headedness, numbness and headaches. All other systems reviewed and are negative.     Past Medical History:   Diagnosis Date    Anxiety     Bell's palsy     Chronic obstructive pulmonary disease (HCC)     Cloudy urine 4/22/2022    Depression     Diverticulosis     Erectile dysfunction 6/3/2022    Fatigue     GERD (gastroesophageal reflux disease)     Gonorrhea 07/2019    Hemorrhoids     IBS (irritable bowel syndrome)     Ill-defined condition     fibromyalgia    Left ACL tear     Pre-diabetes     Psychiatric disorder     SLAP tear of shoulder     Thyroid disease     s/p partial thyroidectomy for goiter    Tobacco dependence 10/11/2021    Tumors     Vision changes         Past Surgical History:   Procedure Laterality Date    ANTERIOR CRUCIATE LIGAMENT REPAIR      L shoulder    HEENT      IR BIOPSY THYROID PERC CORE NEEDLE  2007    HAS HALF THYROID    MASTECTOMY, BILATERAL      ORTHOPEDIC SURGERY      L shoulder         Family History   Problem Relation Age of Onset    Cancer Brother         VARIOUS TUMORS    No Known Problems Father     Heart Disease Mother     Depression Mother     Anxiety Disorder Mother     Cancer Mother     Kidney Disease Mother     Hypertension Mother     Diabetes Brother         Social History     Socioeconomic History    Marital status: Single     Spouse name: None    Number of children: None    Years of education: None    Highest education level: None   Tobacco Use    Smoking status: Every Day     Packs/day: 0.50     Types: Cigarettes    Smokeless tobacco: Never   Substance and Sexual Activity    Alcohol use: Not Currently    Drug use: No        Allergies: Levonorgestrel-ethinyl estrad    Previous Medications    ALBUTEROL SULFATE  (90 BASE) MCG/ACT INHALER    INHALE 1 PUFF BY MOUTH AS NEEDED FOR WHEEZING OR SHORTNESS OF BREATH    ASPIRIN 325 MG TABLET    Take 325 mg by mouth daily    CITALOPRAM (CELEXA) 10 MG TABLET    Take 10 mg by mouth daily    DICYCLOMINE (BENTYL) 10 MG CAPSULE    Take 10 mg by mouth 4 times daily (before meals and nightly)    OMEPRAZOLE (PRILOSEC) 40 MG DELAYED RELEASE CAPSULE    Take 1 capsule by mouth daily    TADALAFIL (CIALIS) 10 MG TABLET    Take 1 tablet by mouth as needed for Erectile Dysfunction        Vitals signs and nursing note reviewed. Patient Vitals for the past 4 hrs:   Temp Pulse Resp BP SpO2   10/07/22 1853 97.7 °F (36.5 °C) 93 18 (!) 130/93 94 %          Physical Exam  Vitals and nursing note reviewed. Constitutional:       Appearance: Normal appearance. HENT:      Head: Normocephalic and atraumatic. Cardiovascular:      Rate and Rhythm: Normal rate and regular rhythm. Pulmonary:      Effort: Pulmonary effort is normal.      Breath sounds: Normal breath sounds. No wheezing. Abdominal:      General: Bowel sounds are normal.      Palpations: Abdomen is soft. Tenderness: There is no abdominal tenderness. Musculoskeletal:         General: No swelling. Normal range of motion. Cervical back: Normal range of motion. No tenderness. Skin:     General: Skin is warm and dry. Neurological:      Mental Status: He is alert.         Procedures      Results for orders placed or performed during the hospital encounter of 10/07/22   CBC with Auto Differential   Result Value Ref Range    WBC 10.8 4.3 - 11.1 K/uL    RBC 5.17 4.23 - 5.6 M/uL    Hemoglobin 15.4 13.6 - 17.2 g/dL    Hematocrit 47.8 41.1 - 50.3 %    MCV 92.5 79.6 - 97.8 FL    MCH 29.8 26.1 - 32.9 PG    MCHC 32.2 31.4 - 35.0 g/dL    RDW 13.2 11.9 - 14.6 %    Platelets 056 643 - 451 K/uL    MPV 10.4 9.4 - 12.3 FL    nRBC 0.00 0.0 - 0.2 K/uL    Differential Type AUTOMATED      Seg Neutrophils 50 43 - 78 %    Lymphocytes 35 13 - 44 %    Monocytes 9 4.0 - 12.0 %    Eosinophils % 5 0.5 - 7.8 %    Basophils 1 0.0 - 2.0 %    Immature Granulocytes 0 0.0 - 5.0 %    Segs Absolute 5.4 1.7 - 8.2 K/UL    Absolute Lymph # 3.8 0.5 - 4.6 K/UL    Absolute Mono # 0.9 0.1 - 1.3 K/UL    Absolute Eos # 0.5 0.0 - 0.8 K/UL    Basophils Absolute 0.1 0.0 - 0.2 K/UL    Absolute Immature Granulocyte 0.0 0.0 - 0.5 K/UL   Comprehensive Metabolic Panel   Result Value Ref Range    Sodium 137 136 - 145 mmol/L    Potassium 4.4 3.5 - 5.1 mmol/L    Chloride 106 101 - 110 mmol/L    CO2 27 21 - 32 mmol/L    Anion Gap 4 4 - 13 mmol/L    Glucose 92 65 - 100 mg/dL    BUN 16 6 - 23 MG/DL    Creatinine 1.36 0.8 - 1.5 MG/DL    Est, Glom Filt Rate >60 >60 ml/min/1.73m2    Calcium 9.1 8.3 - 10.4 MG/DL    Total Bilirubin 0.6 0.2 - 1.1 MG/DL    ALT 34 12 - 65 U/L    AST 16 15 - 37 U/L    Alk Phosphatase 153 (H) 50 - 136 U/L    Total Protein 8.0 6.3 - 8.2 g/dL    Albumin 3.8 3.5 - 5.0 g/dL    Globulin 4.2 (H) 2.3 - 3.5 g/dL    Albumin/Globulin Ratio 0.9 (L) 1.2 - 3.5          No orders to display                         Voice dictation software was used during the making of this note. This software is not perfect and grammatical and other typographical errors may be present. This note has not been completely proofread for errors.      Maurice Asher III, MD  10/07/22 2006

## 2022-10-08 NOTE — ED NOTES
I have reviewed discharge instructions with the patient. The patient verbalized understanding. Patient left ED via Discharge Method: ambulatory to Home with friend. Opportunity for questions and clarification provided. Patient given 2 scripts. To continue your aftercare when you leave the hospital, you may receive an automated call from our care team to check in on how you are doing. This is a free service and part of our promise to provide the best care and service to meet your aftercare needs.  If you have questions, or wish to unsubscribe from this service please call [phone]. Thank you for Choosing our [facility].       Dar Sexton RN  10/07/22 2019

## 2022-10-31 ENCOUNTER — OFFICE VISIT (OUTPATIENT)
Dept: INTERNAL MEDICINE CLINIC | Facility: CLINIC | Age: 47
End: 2022-10-31
Payer: MEDICARE

## 2022-10-31 VITALS
DIASTOLIC BLOOD PRESSURE: 87 MMHG | SYSTOLIC BLOOD PRESSURE: 128 MMHG | TEMPERATURE: 98.2 F | RESPIRATION RATE: 15 BRPM | HEIGHT: 73 IN | WEIGHT: 197.8 LBS | OXYGEN SATURATION: 99 % | BODY MASS INDEX: 26.21 KG/M2 | HEART RATE: 89 BPM

## 2022-10-31 DIAGNOSIS — R49.0 VOICE HOARSENESS: ICD-10-CM

## 2022-10-31 DIAGNOSIS — F17.210 CIGARETTE SMOKER: ICD-10-CM

## 2022-10-31 DIAGNOSIS — R03.0 ELEVATED BLOOD PRESSURE READING: ICD-10-CM

## 2022-10-31 DIAGNOSIS — J02.9 SORE THROAT: ICD-10-CM

## 2022-10-31 DIAGNOSIS — J02.8 ACUTE PHARYNGITIS DUE TO OTHER SPECIFIED ORGANISMS: Primary | ICD-10-CM

## 2022-10-31 PROCEDURE — 99213 OFFICE O/P EST LOW 20 MIN: CPT | Performed by: NURSE PRACTITIONER

## 2022-10-31 RX ORDER — METHYLPREDNISOLONE 4 MG/1
TABLET ORAL
Qty: 1 KIT | Refills: 0 | Status: SHIPPED | OUTPATIENT
Start: 2022-10-31 | End: 2022-11-06

## 2022-10-31 RX ORDER — CEPHALEXIN 500 MG/1
CAPSULE ORAL
Qty: 40 CAPSULE | Refills: 0 | Status: SHIPPED | OUTPATIENT
Start: 2022-10-31

## 2022-10-31 ASSESSMENT — ANXIETY QUESTIONNAIRES
5. BEING SO RESTLESS THAT IT IS HARD TO SIT STILL: 3
6. BECOMING EASILY ANNOYED OR IRRITABLE: 3
2. NOT BEING ABLE TO STOP OR CONTROL WORRYING: 0
IF YOU CHECKED OFF ANY PROBLEMS ON THIS QUESTIONNAIRE, HOW DIFFICULT HAVE THESE PROBLEMS MADE IT FOR YOU TO DO YOUR WORK, TAKE CARE OF THINGS AT HOME, OR GET ALONG WITH OTHER PEOPLE: VERY DIFFICULT
7. FEELING AFRAID AS IF SOMETHING AWFUL MIGHT HAPPEN: 0
1. FEELING NERVOUS, ANXIOUS, OR ON EDGE: 3
4. TROUBLE RELAXING: 3
GAD7 TOTAL SCORE: 15
3. WORRYING TOO MUCH ABOUT DIFFERENT THINGS: 3

## 2022-10-31 ASSESSMENT — PATIENT HEALTH QUESTIONNAIRE - PHQ9
4. FEELING TIRED OR HAVING LITTLE ENERGY: 3
9. THOUGHTS THAT YOU WOULD BE BETTER OFF DEAD, OR OF HURTING YOURSELF: 0
SUM OF ALL RESPONSES TO PHQ QUESTIONS 1-9: 18
SUM OF ALL RESPONSES TO PHQ9 QUESTIONS 1 & 2: 6
SUM OF ALL RESPONSES TO PHQ QUESTIONS 1-9: 18
SUM OF ALL RESPONSES TO PHQ QUESTIONS 1-9: 18
8. MOVING OR SPEAKING SO SLOWLY THAT OTHER PEOPLE COULD HAVE NOTICED. OR THE OPPOSITE, BEING SO FIGETY OR RESTLESS THAT YOU HAVE BEEN MOVING AROUND A LOT MORE THAN USUAL: 3
7. TROUBLE CONCENTRATING ON THINGS, SUCH AS READING THE NEWSPAPER OR WATCHING TELEVISION: 0
2. FEELING DOWN, DEPRESSED OR HOPELESS: 3
6. FEELING BAD ABOUT YOURSELF - OR THAT YOU ARE A FAILURE OR HAVE LET YOURSELF OR YOUR FAMILY DOWN: 0
SUM OF ALL RESPONSES TO PHQ QUESTIONS 1-9: 18
3. TROUBLE FALLING OR STAYING ASLEEP: 3
10. IF YOU CHECKED OFF ANY PROBLEMS, HOW DIFFICULT HAVE THESE PROBLEMS MADE IT FOR YOU TO DO YOUR WORK, TAKE CARE OF THINGS AT HOME, OR GET ALONG WITH OTHER PEOPLE: 2
1. LITTLE INTEREST OR PLEASURE IN DOING THINGS: 3
5. POOR APPETITE OR OVEREATING: 3

## 2022-10-31 ASSESSMENT — ENCOUNTER SYMPTOMS
NAUSEA: 0
BACK PAIN: 0
SORE THROAT: 1
SHORTNESS OF BREATH: 0
CONSTIPATION: 0
VOMITING: 0
EYE PAIN: 0
RHINORRHEA: 0
DIARRHEA: 0
COUGH: 0
ABDOMINAL PAIN: 0
SINUS PAIN: 0

## 2022-10-31 NOTE — PROGRESS NOTES
[address]  Tel# [phone]  Fax# [phone]       Matt Jimenez, [facility]  Family Nurse Practitioner            Date of Visit: 10/31/2022     Edward Hanley (: 1975) is a 41 y.o. male  established patient, here for evaluation of the following chief complaint(s):    Chief Complaint   Patient presents with    Pharyngitis     X 5 days, post nasal drip         Patient Care Team:  Flavia Che DO as PCP - [address] [place] [address], DO as PCP - [facility] Empaneled Provider         History of Present Illness        Same Day Appt, Dr. Jt Madsen pt. Sore Throat  Presents here same day appt with complaint of sore throat that started suddenly 5 days ago. Associated with nasal congestion, hoarseness, left ear congestion. Felt some fatigue this morning. States \"I'm always fatigued\". Has tried salt water rinse. Has not tried any OTC ibuprofen. Denies any fever. Pt states he is villalta and did oral sex  6 days ago. States \"Amoxicillin never worked for Needle HR (hx of frequent Amoxicillin use for dentist appt, hx of heart prolapse). Refused strep test today. Elevated Blood Pressure  Pt states he was told at 4 doctor's appt this year and at the ER that his blood pressure has been high. States he has family hx of HTN. Trying to cut back on smoking. Last smoked cigarette about 1 hour ago. Has been eating out frequently. 6/3/2022 visit with Dr. Jt Madsen: /83.       Patient Active Problem List   Diagnosis    Diverticulosis    Vision changes    Polyarthralgia    Anxiety    GERD (gastroesophageal reflux disease)    Chronic obstructive pulmonary disease (HCC)    Tobacco dependence    Urgency incontinence    Erectile dysfunction    Urgency of urination    Cigarette smoker       Past Medical History:   Diagnosis Date    Anxiety     Bell's palsy     Chronic obstructive pulmonary disease (HCC)     Cloudy urine 2022    Depression     Diverticulosis     Erectile dysfunction 6/3/2022    Fatigue     GERD (gastroesophageal reflux disease)     Gonorrhea 07/2019    Hemorrhoids     IBS (irritable bowel syndrome)     Ill-defined condition     fibromyalgia    Left ACL tear     Pre-diabetes     Psychiatric disorder     SLAP tear of shoulder     Thyroid disease     s/p partial thyroidectomy for goiter    Tobacco dependence 10/11/2021    Tumors     Vision changes        Past Surgical History:   Procedure Laterality Date    ANTERIOR CRUCIATE LIGAMENT REPAIR      L shoulder    HEENT      IR BIOPSY THYROID PERC CORE NEEDLE  2007    HAS HALF THYROID    MASTECTOMY, BILATERAL      ORTHOPEDIC SURGERY      L shoulder        Family History   Problem Relation Age of Onset    Cancer Brother         VARIOUS TUMORS    No Known Problems Father     Heart Disease Mother     Depression Mother     Anxiety Disorder Mother     Cancer Mother     Kidney Disease Mother     Hypertension Mother     Diabetes Brother          ALLERGY  Allergies   Allergen Reactions    Levonorgestrel-Ethinyl Estrad Other (See Comments)     IS SEASONAL ALLERGIES, NOT \"SEASONALE\" MEDICATION!!           Current Outpatient Medications on File Prior to Visit   Medication Sig Dispense Refill    esomeprazole (NEXIUM) 20 MG delayed release capsule Take 1 capsule by mouth every morning (before breakfast) 20 capsule 0    omeprazole (PRILOSEC) 40 MG delayed release capsule Take 1 capsule by mouth daily 30 capsule 2    albuterol sulfate  (90 Base) MCG/ACT inhaler INHALE 1 PUFF BY MOUTH AS NEEDED FOR WHEEZING OR SHORTNESS OF BREATH      dicyclomine (BENTYL) 10 MG capsule Take 10 mg by mouth 4 times daily (before meals and nightly)      famotidine (PEPCID) 40 MG tablet Take 0.5 tablets by mouth 2 times daily for 20 days 20 tablet 0    citalopram (CELEXA) 10 MG tablet Take 10 mg by mouth daily      tadalafil (CIALIS) 10 MG tablet Take 1 tablet by mouth as needed for Erectile Dysfunction 30 tablet 2    aspirin 325 MG tablet Take 325 mg by mouth daily (Patient not taking: Reported on 10/31/2022)       No current facility-administered medications on file prior to visit. Review of Systems  Review of Systems   Constitutional:  Negative for chills, fatigue and fever. HENT:  Positive for congestion (left ear congestion) and sore throat. Negative for postnasal drip, rhinorrhea, sinus pain and sneezing. Eyes:  Negative for pain and visual disturbance. Respiratory:  Negative for cough and shortness of breath. Cardiovascular:  Negative for chest pain, palpitations and leg swelling. Gastrointestinal:  Negative for abdominal pain, constipation, diarrhea, nausea and vomiting. Genitourinary:  Negative for dysuria, frequency and urgency. Musculoskeletal:  Negative for back pain, gait problem and joint swelling. Skin:  Negative for rash and wound. Neurological:  Negative for dizziness and headaches. Psychiatric/Behavioral:  Negative for behavioral problems, sleep disturbance and suicidal ideas. The patient is not nervous/anxious. Vitals:    10/31/22 1449 10/31/22 1519   BP: (!) 153/101 128/87   Site: Left Upper Arm Right Upper Arm   Position: Sitting Sitting   Cuff Size: Large Adult Medium Adult   Pulse: 89    Resp: 15    Temp: 98.2 °F (36.8 °C)    TempSrc: Temporal    SpO2: 99%    Weight: 197 lb 12.8 oz (89.7 kg)    Height: 6' 1.25\" (1.861 m)               Physical Exam  Physical Exam  Constitutional:       General: He is not in acute distress. Appearance: He is not ill-appearing. HENT:      Head: Normocephalic and atraumatic. Right Ear: Tympanic membrane normal.      Left Ear: Tympanic membrane normal.      Mouth/Throat:      Pharynx: Posterior oropharyngeal erythema present. No oropharyngeal exudate (left tonsil). Comments: Left nasal turbinate edematous  Eyes:      Pupils: Pupils are equal, round, and reactive to light. Cardiovascular:      Rate and Rhythm: Normal rate and regular rhythm.    Pulmonary: Effort: Pulmonary effort is normal. No respiratory distress. Breath sounds: Normal breath sounds. Abdominal:      General: Bowel sounds are normal.      Palpations: Abdomen is soft. Musculoskeletal:         General: Normal range of motion. Cervical back: Neck supple. Skin:     General: Skin is warm and dry. Neurological:      General: No focal deficit present. Mental Status: He is alert and oriented to person, place, and time. Psychiatric:         Mood and Affect: Mood normal.         Thought Content: Thought content normal.              Assessment/Plan:          ICD-10-CM    1. Acute pharyngitis due to other specified organisms  J02.8 cephALEXin (KEFLEX) 500 MG capsule    Avoid sharing utensils, change tooth brush. Advised on hand and respiratory hygiene. Advised on fluids, foods rich in Vit C.      2. Sore throat  J02.9 methylPREDNISolone (MEDROL DOSEPACK) 4 MG tablet      3. Elevated blood pressure reading  R03.0       4. Voice hoarseness  R49.0 methylPREDNISolone (MEDROL DOSEPACK) 4 MG tablet      5. Cigarette smoker  F17.210                [facility] was seen today for pharyngitis. Diagnoses and all orders for this visit:    Acute pharyngitis due to other specified organisms  Comments:  Avoid sharing utensils, change tooth brush. Advised on hand and respiratory hygiene. Advised on fluids, foods rich in Vit C. Orders:  -     cephALEXin (KEFLEX) 500 MG capsule; Take 2 caps orally twice a day for 10 days, no refill. Sore throat  -     methylPREDNISolone (MEDROL DOSEPACK) 4 MG tablet; Take by mouth as directed in the pack. Elevated blood pressure reading    Voice hoarseness  -     methylPREDNISolone (MEDROL DOSEPACK) 4 MG tablet; Take by mouth as directed in the pack. Cigarette smoker       Advise to monitor BP at home. States he has a blood pressure monitor at home. Advised on low salt diet. Advised to avoid processed foods such as fast foods, canned foods.  Advised to read food labels. Advised to limit stress or find ways to reduce stress. Advised on physical activity or exercise 3-5 days a week, for at least 30 minutes, as tolerated. Reviewed cardiovascular risks and complication prevention. Advised to seek immediate medical attention (call 911 or present to Emergency Dept) for any chest pains, palpitations or shortness of breath. Advised on smoking cessation. Reviewed CV risks. Advised to follow up with PCP Dr. Danilo Estrada. Follow Up  Return in about 1 week (around 11/7/2022), or if symptoms worsen or fail to improve, for Follow up with PCP Dr. Danilo Estrada.              Guera [facility], DNP, FNP-BC

## 2022-11-09 ENCOUNTER — TELEPHONE (OUTPATIENT)
Dept: INTERNAL MEDICINE CLINIC | Facility: CLINIC | Age: 47
End: 2022-11-09

## 2023-02-03 ENCOUNTER — HOSPITAL ENCOUNTER (OUTPATIENT)
Dept: CT IMAGING | Age: 48
End: 2023-02-03
Payer: MEDICARE

## 2023-02-03 ENCOUNTER — TRANSCRIBE ORDERS (OUTPATIENT)
Dept: SCHEDULING | Age: 48
End: 2023-02-03

## 2023-02-03 DIAGNOSIS — R10.84 GENERALIZED ABDOMINAL PAIN: ICD-10-CM

## 2023-02-03 DIAGNOSIS — R10.814 ABDOMINAL TENDERNESS OF LEFT LOWER QUADRANT, REBOUND TENDERNESS PRESENCE NOT SPECIFIED: ICD-10-CM

## 2023-02-03 DIAGNOSIS — K62.5 RECTAL BLEEDING: ICD-10-CM

## 2023-02-03 DIAGNOSIS — K57.90 DIVERTICULAR DISEASE: ICD-10-CM

## 2023-02-03 DIAGNOSIS — K57.90 DIVERTICULAR DISEASE: Primary | ICD-10-CM

## 2023-02-03 PROCEDURE — 74177 CT ABD & PELVIS W/CONTRAST: CPT

## 2023-02-03 PROCEDURE — 6360000004 HC RX CONTRAST MEDICATION: Performed by: PHYSICIAN ASSISTANT

## 2023-02-03 PROCEDURE — 2580000003 HC RX 258: Performed by: PHYSICIAN ASSISTANT

## 2023-02-03 RX ORDER — SODIUM CHLORIDE 0.9 % (FLUSH) 0.9 %
5-40 SYRINGE (ML) INJECTION 2 TIMES DAILY
Status: DISCONTINUED | OUTPATIENT
Start: 2023-02-03 | End: 2023-02-07 | Stop reason: HOSPADM

## 2023-02-03 RX ORDER — 0.9 % SODIUM CHLORIDE 0.9 %
100 INTRAVENOUS SOLUTION INTRAVENOUS ONCE
Status: DISCONTINUED | OUTPATIENT
Start: 2023-02-03 | End: 2023-02-07 | Stop reason: HOSPADM

## 2023-02-03 RX ADMIN — DIATRIZOATE MEGLUMINE AND DIATRIZOATE SODIUM 15 ML: 660; 100 LIQUID ORAL; RECTAL at 12:19

## 2023-02-03 RX ADMIN — IOPAMIDOL 100 ML: 755 INJECTION, SOLUTION INTRAVENOUS at 12:19

## 2023-02-03 RX ADMIN — SODIUM CHLORIDE, PRESERVATIVE FREE 10 ML: 5 INJECTION INTRAVENOUS at 12:20

## 2023-02-07 ENCOUNTER — OFFICE VISIT (OUTPATIENT)
Dept: INTERNAL MEDICINE CLINIC | Facility: CLINIC | Age: 48
End: 2023-02-07
Payer: MEDICARE

## 2023-02-07 VITALS
BODY MASS INDEX: 26.16 KG/M2 | SYSTOLIC BLOOD PRESSURE: 130 MMHG | WEIGHT: 197.4 LBS | DIASTOLIC BLOOD PRESSURE: 89 MMHG | OXYGEN SATURATION: 97 % | TEMPERATURE: 98.4 F | HEIGHT: 73 IN | RESPIRATION RATE: 12 BRPM | HEART RATE: 76 BPM

## 2023-02-07 DIAGNOSIS — R20.2 NUMBNESS AND TINGLING: ICD-10-CM

## 2023-02-07 DIAGNOSIS — N52.9 ERECTILE DYSFUNCTION, UNSPECIFIED ERECTILE DYSFUNCTION TYPE: ICD-10-CM

## 2023-02-07 DIAGNOSIS — F41.9 ANXIETY: Primary | ICD-10-CM

## 2023-02-07 DIAGNOSIS — N28.1 SIMPLE CYST OF KIDNEY: ICD-10-CM

## 2023-02-07 DIAGNOSIS — Z79.899 OTHER LONG TERM (CURRENT) DRUG THERAPY: ICD-10-CM

## 2023-02-07 DIAGNOSIS — R20.0 NUMBNESS AND TINGLING: ICD-10-CM

## 2023-02-07 DIAGNOSIS — M79.7 FIBROMYALGIA: ICD-10-CM

## 2023-02-07 DIAGNOSIS — M25.50 POLYARTHRALGIA: ICD-10-CM

## 2023-02-07 DIAGNOSIS — R73.09 ELEVATED HEMOGLOBIN A1C: ICD-10-CM

## 2023-02-07 DIAGNOSIS — J43.9 PULMONARY EMPHYSEMA, UNSPECIFIED EMPHYSEMA TYPE (HCC): ICD-10-CM

## 2023-02-07 PROBLEM — N39.41 URGENCY INCONTINENCE: Status: RESOLVED | Noted: 2022-04-22 | Resolved: 2023-02-07

## 2023-02-07 PROBLEM — R39.15 URGENCY OF URINATION: Status: RESOLVED | Noted: 2022-06-03 | Resolved: 2023-02-07

## 2023-02-07 PROCEDURE — 99214 OFFICE O/P EST MOD 30 MIN: CPT | Performed by: INTERNAL MEDICINE

## 2023-02-07 RX ORDER — FLUTICASONE FUROATE, UMECLIDINIUM BROMIDE AND VILANTEROL TRIFENATATE 100; 62.5; 25 UG/1; UG/1; UG/1
1 POWDER RESPIRATORY (INHALATION) DAILY
Qty: 1 EACH | Refills: 3 | Status: SHIPPED | OUTPATIENT
Start: 2023-02-07

## 2023-02-07 RX ORDER — ALBUTEROL SULFATE 90 UG/1
2 AEROSOL, METERED RESPIRATORY (INHALATION) 4 TIMES DAILY
Qty: 18 G | Refills: 1 | Status: SHIPPED | OUTPATIENT
Start: 2023-02-07

## 2023-02-07 RX ORDER — CITALOPRAM 10 MG/1
10 TABLET ORAL DAILY
Qty: 30 TABLET | Refills: 2 | Status: SHIPPED | OUTPATIENT
Start: 2023-02-07 | End: 2023-05-08

## 2023-02-07 RX ORDER — TADALAFIL 10 MG/1
10 TABLET ORAL PRN
Qty: 30 TABLET | Refills: 2 | Status: SHIPPED | OUTPATIENT
Start: 2023-02-07 | End: 2023-03-09

## 2023-02-07 SDOH — ECONOMIC STABILITY: FOOD INSECURITY: WITHIN THE PAST 12 MONTHS, YOU WORRIED THAT YOUR FOOD WOULD RUN OUT BEFORE YOU GOT MONEY TO BUY MORE.: NEVER TRUE

## 2023-02-07 SDOH — ECONOMIC STABILITY: INCOME INSECURITY: HOW HARD IS IT FOR YOU TO PAY FOR THE VERY BASICS LIKE FOOD, HOUSING, MEDICAL CARE, AND HEATING?: NOT HARD AT ALL

## 2023-02-07 SDOH — ECONOMIC STABILITY: FOOD INSECURITY: WITHIN THE PAST 12 MONTHS, THE FOOD YOU BOUGHT JUST DIDN'T LAST AND YOU DIDN'T HAVE MONEY TO GET MORE.: NEVER TRUE

## 2023-02-07 SDOH — ECONOMIC STABILITY: HOUSING INSECURITY
IN THE LAST 12 MONTHS, WAS THERE A TIME WHEN YOU DID NOT HAVE A STEADY PLACE TO SLEEP OR SLEPT IN A SHELTER (INCLUDING NOW)?: NO

## 2023-02-07 ASSESSMENT — PATIENT HEALTH QUESTIONNAIRE - PHQ9
9. THOUGHTS THAT YOU WOULD BE BETTER OFF DEAD, OR OF HURTING YOURSELF: 0
10. IF YOU CHECKED OFF ANY PROBLEMS, HOW DIFFICULT HAVE THESE PROBLEMS MADE IT FOR YOU TO DO YOUR WORK, TAKE CARE OF THINGS AT HOME, OR GET ALONG WITH OTHER PEOPLE: 0
SUM OF ALL RESPONSES TO PHQ QUESTIONS 1-9: 0
7. TROUBLE CONCENTRATING ON THINGS, SUCH AS READING THE NEWSPAPER OR WATCHING TELEVISION: 0
4. FEELING TIRED OR HAVING LITTLE ENERGY: 0
8. MOVING OR SPEAKING SO SLOWLY THAT OTHER PEOPLE COULD HAVE NOTICED. OR THE OPPOSITE, BEING SO FIGETY OR RESTLESS THAT YOU HAVE BEEN MOVING AROUND A LOT MORE THAN USUAL: 0
6. FEELING BAD ABOUT YOURSELF - OR THAT YOU ARE A FAILURE OR HAVE LET YOURSELF OR YOUR FAMILY DOWN: 0
SUM OF ALL RESPONSES TO PHQ QUESTIONS 1-9: 0
3. TROUBLE FALLING OR STAYING ASLEEP: 0
SUM OF ALL RESPONSES TO PHQ QUESTIONS 1-9: 0
SUM OF ALL RESPONSES TO PHQ QUESTIONS 1-9: 0
2. FEELING DOWN, DEPRESSED OR HOPELESS: 0
SUM OF ALL RESPONSES TO PHQ9 QUESTIONS 1 & 2: 0
5. POOR APPETITE OR OVEREATING: 0
1. LITTLE INTEREST OR PLEASURE IN DOING THINGS: 0

## 2023-02-07 ASSESSMENT — ENCOUNTER SYMPTOMS
EYES NEGATIVE: 1
BLOOD IN STOOL: 1

## 2023-02-07 ASSESSMENT — ANXIETY QUESTIONNAIRES
IF YOU CHECKED OFF ANY PROBLEMS ON THIS QUESTIONNAIRE, HOW DIFFICULT HAVE THESE PROBLEMS MADE IT FOR YOU TO DO YOUR WORK, TAKE CARE OF THINGS AT HOME, OR GET ALONG WITH OTHER PEOPLE: NOT DIFFICULT AT ALL
GAD7 TOTAL SCORE: 0
2. NOT BEING ABLE TO STOP OR CONTROL WORRYING: 0
7. FEELING AFRAID AS IF SOMETHING AWFUL MIGHT HAPPEN: 0
1. FEELING NERVOUS, ANXIOUS, OR ON EDGE: 0
3. WORRYING TOO MUCH ABOUT DIFFERENT THINGS: 0
6. BECOMING EASILY ANNOYED OR IRRITABLE: 0
5. BEING SO RESTLESS THAT IT IS HARD TO SIT STILL: 0
4. TROUBLE RELAXING: 0

## 2023-02-07 NOTE — PROGRESS NOTES
Rayna Primrose, D.O. [facility]  [address], [address] [facility] [address]  Tel: [phone]    Office Visit: Follow Up     Patient Name: Arvind Sousa   :  1975   MRN:   [id number]      Today's Date: 23 8:16 AM    Subjective     The patient is a 41y.o.-year-old male who presents for follow-up. Anxiety  -stopped taking Celexa, never really started it, he was nervous about   -he could not take Wellbutrin because it made him mean     Polyarthralgia/joint pain/positive SHAI  -Follows with rheumatology, unremarkable work-up so far    Acid reflux  -Omeprazole 40 mg daily    Erectile dysfunction  -Cialis 10 mg as needed, couldn't afford it     COPD  -On Albuterol inhaler using daily     High risk homosexual behavior    Simple kidney cyst  -2.1 cm, seen on last CT scan from 2/3/2023    Diverticulosis    Small hiatal hernia    Today:   He is here today to review his abdominal CT completed on 2023. Presented to hospital in October for rectal bleeding. He states nothing was done and he is still having rectal bleeding. He states he called his GI doctor at this time and she ordered a CT scan of his abdomen. Bleeding from his rectum stopped yesterday, it was bright red mixed with dark red. He states this was a lot. He states he does not participate in anal sex. He states he also has pain in his right leg whenever he has a BM. He states using the bathroom has been a nightmare because his rectum hurts. He states his left leg goes numb when he is walking, only when he is walking. He states he has no feeling his leg at certain points throughout the day. He states he has no feeling from his left knee to his hip when he is walking. He states this has been going on for 2-3 years. He denies numbness/tingling in his toes/feet. Review of Systems   Constitutional: Negative. HENT: Negative. Eyes: Negative. Cardiovascular: Negative.     Gastrointestinal:  Positive for blood in stool. Endocrine: Negative. Genitourinary: Negative. Musculoskeletal: Negative. Skin: Negative. Neurological: Negative. Hematological: Negative. Psychiatric/Behavioral:  The patient is nervous/anxious.        Past Medical History:   Diagnosis Date    Anxiety     Bell's palsy     Chronic obstructive pulmonary disease (HCC)     Cloudy urine 4/22/2022    Depression     Diverticulosis     Erectile dysfunction 6/3/2022    Fatigue     GERD (gastroesophageal reflux disease)     Gonorrhea 07/2019    Hemorrhoids     IBS (irritable bowel syndrome)     Ill-defined condition     fibromyalgia    Left ACL tear     Pre-diabetes     Psychiatric disorder     SLAP tear of shoulder     Thyroid disease     s/p partial thyroidectomy for goiter    Tobacco dependence 10/11/2021    Tumors     Vision changes      Social History     Socioeconomic History    Marital status: Single     Spouse name: Not on file    Number of children: Not on file    Years of education: Not on file    Highest education level: Not on file   Occupational History    Not on file   Tobacco Use    Smoking status: Every Day     Packs/day: 0.50     Types: Cigarettes    Smokeless tobacco: Never   Substance and Sexual Activity    Alcohol use: Not Currently    Drug use: No    Sexual activity: Not on file   Other Topics Concern    Not on file   Social History Narrative    Not on file     Social Determinants of Health     Financial Resource Strain: Not on file   Food Insecurity: Not on file   Transportation Needs: Not on file   Physical Activity: Not on file   Stress: Not on file   Social Connections: Not on file   Intimate Partner Violence: Not on file   Housing Stability: Not on file         Current Outpatient Medications   Medication Sig    cephALEXin (KEFLEX) 500 MG capsule Take 2 caps orally twice a day for 10 days, no refill.    famotidine (PEPCID) 40 MG tablet Take 0.5 tablets by mouth 2 times daily for 20 days    esomeprazole (NEXIUM) 20 MG delayed release capsule Take 1 capsule by mouth every morning (before breakfast)    citalopram (CELEXA) 10 MG tablet Take 10 mg by mouth daily    omeprazole (PRILOSEC) 40 MG delayed release capsule Take 1 capsule by mouth daily    tadalafil (CIALIS) 10 MG tablet Take 1 tablet by mouth as needed for Erectile Dysfunction    albuterol sulfate  (90 Base) MCG/ACT inhaler INHALE 1 PUFF BY MOUTH AS NEEDED FOR WHEEZING OR SHORTNESS OF BREATH    aspirin 325 MG tablet Take 325 mg by mouth daily (Patient not taking: Reported on 10/31/2022)    dicyclomine (BENTYL) 10 MG capsule Take 10 mg by mouth 4 times daily (before meals and nightly)     No current facility-administered medications for this visit. Objective     There were no vitals filed for this visit. Physical Exam:  Constitutional: Appears well kempt. Alert/oriented x3. In no acute distress. Head: Normocephalic No trauma. No deformity. Cardiac: Heart with normal rate/rhythm. No murmurs. No gallops. Pulses normal.   Pulmonary: Lungs clear to auscultation bilaterally. In no respiratory distress. No wheezing. No rales. No rhonci. Psychiatric: Normal thought content. Normal behavior. Normal judgment. Recommendations     Assessment:  Patient Active Problem List   Diagnosis    Diverticulosis    Vision changes    Polyarthralgia    Anxiety    GERD (gastroesophageal reflux disease)    Chronic obstructive pulmonary disease (HCC)    Tobacco dependence    Urgency incontinence    Erectile dysfunction    Urgency of urination    Cigarette smoker      Status of above conditions: stable    Plan:  Anxiety  -Refill Celexa 10 mg daily    Polyarthralgia/joint pain/positive SHAI  -Patient would like a second opinion rheumatology referral, we gave this to him last visit but he did not attend the visit because his insurance did not cover it. We we will put a referral in again for Dr. Gene Honeycutt.     Acid reflux  -Omeprazole 40 mg daily    Erectile dysfunction  -Cialis 10 mg daily as needed    COPD  -On Albuterol inhaler using daily   -Prescription given for nebulizer machine  -We will also give him Trelegy    High risk homosexual behavior    Simple kidney cyst  -2.1 cm, seen on last CT scan from 2/3/2023  -[facility]  -Urology referral  -Explained to pt that Simple kidney cysts are commonly observed in normal kidneys, with an increasing incidence as individuals age and that the vast [de-identified] of simple kidney cysts require no treatment. Therapy may rarely be required for symptoms, signs, and/or complications (pain, rupture, hemorrhage, et.)    Rectal bleeding, resolved; rectal pain with defecation  -Patient presented to the ER for this in October, he states that this has resolved since but that it was bright red mixed with dark red. He states he has a history of a gastric ulcer. He states he did reach out to GI and they ordered a CT abdomen for him which she just completed a few days ago. It did not show any gastrointestinal abnormalities but he will call his GI doctor today to schedule a follow-up appointment with them to see if he needs a repeat colonoscopy/EGD. Numbness and tingling in his left upper leg  -Nerve conduction study  -Neurology referral    Diverticulosis    -Previous medical records and/or labs/tests available to me reviewed, any records outstanding not available requested  -The risks, benefits, and medical necessity of all medications, tests labs, and any other orders that were ordered at today's visit were discussed with the patient including all elective or patient requested orders. Decision to order or not order tests or based on this risk/benefit ratio and medical necessity.  -The patient expresses understanding of the plan as I've explained it to him and is in agreement with the current plan. Follow Up:      Total Time: 30 minutes (chart reviewing and in-exam time)    Signed: Chantell Joseph D.O.  02/07/23  8:16 AM

## 2023-02-08 ENCOUNTER — TELEPHONE (OUTPATIENT)
Dept: INTERNAL MEDICINE CLINIC | Facility: CLINIC | Age: 48
End: 2023-02-08

## 2023-02-08 LAB
ANION GAP SERPL CALC-SCNC: 7 MMOL/L (ref 2–11)
BUN SERPL-MCNC: 11 MG/DL (ref 6–23)
CALCIUM SERPL-MCNC: 9.3 MG/DL (ref 8.3–10.4)
CHLORIDE SERPL-SCNC: 108 MMOL/L (ref 101–110)
CO2 SERPL-SCNC: 25 MMOL/L (ref 21–32)
CREAT SERPL-MCNC: 1.3 MG/DL (ref 0.8–1.5)
GLUCOSE SERPL-MCNC: 101 MG/DL (ref 65–100)
POTASSIUM SERPL-SCNC: 4.8 MMOL/L (ref 3.5–5.1)
SODIUM SERPL-SCNC: 140 MMOL/L (ref 133–143)

## 2023-02-08 NOTE — TELEPHONE ENCOUNTER
Received fax on behalf of patient from [address] [place] [address] notifying our office that they have attempted several times to contact the patient in regard to the referral that was placed. The patient will need to contact their office, if the patient would like to proceed with the referral.    The referral has been closed by [address] [place] [address].

## 2023-02-20 ENCOUNTER — OFFICE VISIT (OUTPATIENT)
Dept: UROLOGY | Age: 48
End: 2023-02-20
Payer: MEDICARE

## 2023-02-20 DIAGNOSIS — N28.9 RENAL LESION: Primary | ICD-10-CM

## 2023-02-20 DIAGNOSIS — N39.0 RECURRENT UTI: ICD-10-CM

## 2023-02-20 DIAGNOSIS — Z80.42 FAMILY HX OF PROSTATE CANCER: ICD-10-CM

## 2023-02-20 DIAGNOSIS — N52.9 ERECTILE DYSFUNCTION, UNSPECIFIED ERECTILE DYSFUNCTION TYPE: ICD-10-CM

## 2023-02-20 LAB
BILIRUBIN, URINE, POC: NEGATIVE
BLOOD URINE, POC: NEGATIVE
GLUCOSE URINE, POC: NEGATIVE
KETONES, URINE, POC: NEGATIVE
LEUKOCYTE ESTERASE, URINE, POC: ABNORMAL
NITRITE, URINE, POC: NEGATIVE
PH, URINE, POC: 7 (ref 4.6–8)
PROTEIN,URINE, POC: NEGATIVE
PVR, POC: ABNORMAL CC
SPECIFIC GRAVITY, URINE, POC: 1.02 (ref 1–1.03)
URINALYSIS CLARITY, POC: ABNORMAL
URINALYSIS COLOR, POC: ABNORMAL
UROBILINOGEN, POC: ABNORMAL

## 2023-02-20 PROCEDURE — 99204 OFFICE O/P NEW MOD 45 MIN: CPT | Performed by: NURSE PRACTITIONER

## 2023-02-20 PROCEDURE — 81003 URINALYSIS AUTO W/O SCOPE: CPT | Performed by: NURSE PRACTITIONER

## 2023-02-20 PROCEDURE — 51798 US URINE CAPACITY MEASURE: CPT | Performed by: NURSE PRACTITIONER

## 2023-02-20 RX ORDER — TADALAFIL 10 MG/1
10 TABLET ORAL PRN
Qty: 30 TABLET | Refills: 1 | Status: SHIPPED | OUTPATIENT
Start: 2023-02-20

## 2023-02-20 ASSESSMENT — ENCOUNTER SYMPTOMS
EYES NEGATIVE: 1
COUGH: 1
BLOOD IN STOOL: 1
WHEEZING: 1
HEARTBURN: 1
SKIN LESIONS: 1
ABDOMINAL PAIN: 1
INDIGESTION: 1
SHORTNESS OF BREATH: 1
CONSTIPATION: 1
DIARRHEA: 1
BACK PAIN: 1

## 2023-02-20 NOTE — PROGRESS NOTES
[facility]  [address] [facility] [address], [address] [facility]  [phone]          Juan Deras  : 1975    Chief Complaint   Patient presents with    New Patient     SIMPLE RIGHT CYST           HPI     Juan Deras is a 41 y.o. male w hx of IBS being seen today as a new pt for abnormal CT findings. He underwent CT A/P w IV contrast ordered by GI for [facility] revealing a simple cyst to right kidney, shaheed lesions too small to characterize, and diverticulosis. He reports int right abd pain/swelling over several years as well as int UU, UF, and SP pressure. He reports 3-4 UTI over last year. States his urine is dark and thick today. I do not see a positive urine culture on file. There is hx of STD. Pt had a cysto in  in [place] w abnormal emptying per pt. I do not have these records on file. He was prescribed Cilais 10 mg PRN for ED, however was unable to afford this. Has tried \"gas station pills\" which seem to be effective. There is a component of performance anxiety as well. Current smoker. Father had prostate cancer. Lab Results   Component Value Date    PSA 0.6 2022     PVR 0 cc via u/s. Cr 1.30.        Past Medical History:   Diagnosis Date    Anxiety     Bell's palsy     Chronic obstructive pulmonary disease (HCC)     Cloudy urine 2022    Depression     Diverticulosis     Elevated hemoglobin A1c 2023    Erectile dysfunction 6/3/2022    Fatigue     GERD (gastroesophageal reflux disease)     Gonorrhea 2019    Hemorrhoids     IBS (irritable bowel syndrome)     Ill-defined condition     fibromyalgia    Left ACL tear     Pre-diabetes     Psychiatric disorder     SLAP tear of shoulder     Thyroid disease     s/p partial thyroidectomy for goiter    Tobacco dependence 10/11/2021    Tumors     Urgency incontinence 2022    Urgency of urination 6/3/2022    Vision changes      Past Surgical History:   Procedure Laterality Date    ANTERIOR CRUCIATE LIGAMENT REPAIR      L shoulder    HEENT      IR BIOPSY THYROID PERC CORE NEEDLE  2007    HAS HALF THYROID    MASTECTOMY, BILATERAL      ORTHOPEDIC SURGERY      L shoulder      Current Outpatient Medications   Medication Sig Dispense Refill    tadalafil (CIALIS) 10 MG tablet Take 1 tablet by mouth as needed for Erectile Dysfunction 30 tablet 1    albuterol sulfate HFA (PROVENTIL;VENTOLIN;PROAIR) 108 (90 Base) MCG/ACT inhaler Inhale 2 puffs into the lungs 4 times daily INHALE 1 PUFF BY MOUTH AS NEEDED FOR WHEEZING OR SHORTNESS OF BREATH 18 g 1    citalopram (CELEXA) 10 MG tablet Take 1 tablet by mouth daily 30 tablet 2    fluticasone-umeclidin-vilant (TRELEGY ELLIPTA) 100-62.5-25 MCG/ACT AEPB inhaler Inhale 1 puff into the lungs daily 1 each 3    omeprazole (PRILOSEC) 40 MG delayed release capsule Take 1 capsule by mouth daily 30 capsule 2    aspirin 325 MG tablet Take 325 mg by mouth daily (Patient not taking: No sig reported)      dicyclomine (BENTYL) 10 MG capsule Take 10 mg by mouth 4 times daily (before meals and nightly) (Patient not taking: No sig reported)       No current facility-administered medications for this visit. Allergies   Allergen Reactions    Levonorgestrel-Ethinyl Estrad Other (See Comments)     IS SEASONAL ALLERGIES, NOT \"SEASONALE\" MEDICATION!!      Social History     Socioeconomic History    Marital status: Single     Spouse name: Not on file    Number of children: Not on file    Years of education: Not on file    Highest education level: Not on file   Occupational History    Not on file   Tobacco Use    Smoking status: Every Day     Packs/day: 1.00     Years: 33.00     Pack years: 33.00     Types: Cigarettes    Smokeless tobacco: Never    Tobacco comments:     Decreased to 5 cigarettes    Vaping Use    Vaping Use: Some days    Substances: Nicotine, CBD    Devices: Pre-filled pod   Substance and Sexual Activity    Alcohol use: Not Currently    Drug use: No    Sexual activity: Not on file   Other Topics Concern    Not on file   Social History Narrative    Not on file     Social Determinants of Health     Financial Resource Strain: Low Risk     Difficulty of Paying Living Expenses: Not hard at all   Food Insecurity: No Food Insecurity    Worried About [address] in the Last Year: Never true    [address] in the Last Year: Never true   Transportation Needs: Unknown    Lack of Transportation (Medical): Not on file    Lack of Transportation (Non-Medical): No   Physical Activity: Not on file   Stress: Not on file   Social Connections: Not on file   Intimate Partner Violence: Not on file   Housing Stability: Unknown    Unable to Pay for Housing in the Last Year: Not on file    Number of Places Lived in the Last Year: Not on file    Unstable Housing in the Last Year: No     Family History   Problem Relation Age of Onset    Cancer Brother         VARIOUS TUMORS    No Known Problems Father     Heart Disease Mother     Depression Mother     Anxiety Disorder Mother     Cancer Mother     Kidney Disease Mother     Hypertension Mother     Diabetes Brother        Review of Systems  Constitutional: Positive for fever, chills and headaches. Skin: Positive for skin lesions, rash and itching. Eyes: Eyes negative  ENT: Positive for pain swallowing and dry mouth. Respiratory: Positive for cough, shortness of breath and wheezing. Cardiovascular: Positive for chest pain, hypertension, irregular heartbeat, leg pain, leg swelling and regular rate and rhythm. GI: Positive for abdominal pain, blood in stool, constipation, diarrhea, indigestion and heartburn. Genitourinary: Positive for hematuria, recurrent UTIs, nocturia, slower stream, straining, urgency, leakage w/ urge, frequent urination, incomplete emptying, erectile dysfunction, testicular pain and sexually transmitted disease. Musculoskeletal: Positive for back pain, bone pain, arthralgias, tenderness and muscle weakness.   Neurological: Positive for dizziness, focal weakness and numbness.  Psychological: Neg psych ROS  Endocrine: Positive for cold intolerance, thirst, excessive urination, fatigue and heat intolerance.  Hem/Lymphatic: Hematologic/lymphatic negativePositive for easy bruising and frequent infections.    Urinalysis  UA - Dipstick  Results for orders placed or performed in visit on 02/20/23   AMB POC URINALYSIS DIP STICK AUTO W/O MICRO   Result Value Ref Range    Color (UA POC)      Clarity (UA POC)      Glucose, Urine, POC Negative Negative    Bilirubin, Urine, POC Negative Negative    KETONES, Urine, POC Negative Negative    Specific Gravity, Urine, POC 1.020 1.001 - 1.035    Blood (UA POC) Negative Negative    pH, Urine, POC 7.0 4.6 - 8.0    Protein, Urine, POC Negative Negative    Urobilinogen, POC 2 mg/dL     Nitrite, Urine, POC Negative Negative    Leukocyte Esterase, Urine, POC Small Negative       PHYSICAL EXAM    General appearance - well appearing and in no distress  Mental status - alert, oriented to person, place, and time  Neck - supple, no significant adenopathy  Chest/Lung-  Quiet, even and easy respiratory effort without use of accessory muscles  Skin - normal coloration and turgor, no rashes      Assessment and Plan    ICD-10-CM    1. Renal lesion  N28.9 CT ABDOMEN RENAL MASS Additional Contrast? Radiologist Recommendation      2. Erectile dysfunction, unspecified erectile dysfunction type  N52.9 tadalafil (CIALIS) 10 MG tablet      3. Recurrent UTI  N39.0       4. Family hx of prostate cancer  Z80.42       He would like a closer look at renal lesions. Will proceed w CT renal mass protocol. Results will be called.     Will send script to [facility]. Pt should be able to afford w [employer] coupon. Offered referral to sex therapy, however he declines.     Send urine PCR test today. He will be called w results. Will need further chamberlain w true UTI's.     PSA/MARTINA due in June 2023.    Fu pending further testing. To call sooner if needed.     Gifty CARCAMO  Dottie Mcmillan is supervising physician today and he approves plan of care.

## 2023-02-23 ENCOUNTER — TELEPHONE (OUTPATIENT)
Dept: UROLOGY | Age: 48
End: 2023-02-23

## 2023-02-23 NOTE — TELEPHONE ENCOUNTER
Called pt per request of Jerry Esquivel NP. She has sent urine culture to [facility] after pt's visit on 2-20-23. Rep with [facility] called and specimens have not arrived and are considered lost. Surinder Adams would like pt to come in and leave another specimen. Specimen appt made for tomorrow.

## 2023-03-04 ENCOUNTER — APPOINTMENT (OUTPATIENT)
Dept: GENERAL RADIOLOGY | Age: 48
DRG: 683 | End: 2023-03-04
Payer: MEDICARE

## 2023-03-04 ENCOUNTER — HOSPITAL ENCOUNTER (INPATIENT)
Age: 48
LOS: 1 days | Discharge: HOME | DRG: 683 | End: 2023-03-05
Attending: EMERGENCY MEDICINE | Admitting: FAMILY MEDICINE
Payer: MEDICARE

## 2023-03-04 ENCOUNTER — APPOINTMENT (OUTPATIENT)
Dept: CT IMAGING | Age: 48
DRG: 683 | End: 2023-03-04
Payer: MEDICARE

## 2023-03-04 DIAGNOSIS — R55 NEAR SYNCOPE: ICD-10-CM

## 2023-03-04 DIAGNOSIS — R09.02 HYPOXIA: ICD-10-CM

## 2023-03-04 DIAGNOSIS — J44.1 COPD EXACERBATION (HCC): ICD-10-CM

## 2023-03-04 DIAGNOSIS — N17.9 AKI (ACUTE KIDNEY INJURY) (HCC): Primary | ICD-10-CM

## 2023-03-04 DIAGNOSIS — H53.9 TRANSIENT VISION DISTURBANCE: ICD-10-CM

## 2023-03-04 PROBLEM — F17.200 TOBACCO DEPENDENCE: Status: ACTIVE | Noted: 2021-10-11

## 2023-03-04 PROBLEM — I95.9 HYPOTENSION: Status: ACTIVE | Noted: 2023-03-04

## 2023-03-04 PROBLEM — M25.50 POLYARTHRALGIA: Status: ACTIVE | Noted: 2021-09-20

## 2023-03-04 LAB
ALBUMIN SERPL-MCNC: 3.9 G/DL (ref 3.5–5)
ALBUMIN/GLOB SERPL: 1 (ref 0.4–1.6)
ALP SERPL-CCNC: 158 U/L (ref 50–136)
ALT SERPL-CCNC: 40 U/L (ref 12–65)
ANION GAP SERPL CALC-SCNC: 8 MMOL/L (ref 2–11)
APPEARANCE UR: ABNORMAL
AST SERPL-CCNC: 24 U/L (ref 15–37)
BACTERIA URNS QL MICRO: 0 /HPF
BASOPHILS # BLD: 0.1 K/UL (ref 0–0.2)
BASOPHILS NFR BLD: 1 % (ref 0–2)
BILIRUB SERPL-MCNC: 1 MG/DL (ref 0.2–1.1)
BILIRUB UR QL: NEGATIVE
BUN SERPL-MCNC: 14 MG/DL (ref 6–23)
CALCIUM SERPL-MCNC: 9.3 MG/DL (ref 8.3–10.4)
CASTS URNS QL MICRO: ABNORMAL /LPF
CHLORIDE SERPL-SCNC: 105 MMOL/L (ref 101–110)
CO2 SERPL-SCNC: 24 MMOL/L (ref 21–32)
COLOR UR: YELLOW
CREAT SERPL-MCNC: 1.77 MG/DL (ref 0.8–1.5)
CRP SERPL-MCNC: 0.5 MG/DL (ref 0–0.9)
CRYSTALS URNS QL MICRO: ABNORMAL /LPF
D DIMER PPP FEU-MCNC: <0.27 UG/ML(FEU)
DIFFERENTIAL METHOD BLD: NORMAL
EOSINOPHIL # BLD: 0.4 K/UL (ref 0–0.8)
EOSINOPHIL NFR BLD: 4 % (ref 0.5–7.8)
EPI CELLS #/AREA URNS HPF: ABNORMAL /HPF
ERYTHROCYTE [DISTWIDTH] IN BLOOD BY AUTOMATED COUNT: 13.8 % (ref 11.9–14.6)
ERYTHROCYTE [SEDIMENTATION RATE] IN BLOOD: 4 MM/HR (ref 0–20)
GLOBULIN SER CALC-MCNC: 4 G/DL (ref 2.8–4.5)
GLUCOSE SERPL-MCNC: 143 MG/DL (ref 65–100)
GLUCOSE UR STRIP.AUTO-MCNC: NEGATIVE MG/DL
HCT VFR BLD AUTO: 47.7 % (ref 41.1–50.3)
HGB BLD-MCNC: 15.1 G/DL (ref 13.6–17.2)
HGB UR QL STRIP: NEGATIVE
IMM GRANULOCYTES # BLD AUTO: 0.1 K/UL (ref 0–0.5)
IMM GRANULOCYTES NFR BLD AUTO: 1 % (ref 0–5)
KETONES UR QL STRIP.AUTO: NEGATIVE MG/DL
LEUKOCYTE ESTERASE UR QL STRIP.AUTO: ABNORMAL
LYMPHOCYTES # BLD: 3.2 K/UL (ref 0.5–4.6)
LYMPHOCYTES NFR BLD: 32 % (ref 13–44)
MAGNESIUM SERPL-MCNC: 2.4 MG/DL (ref 1.8–2.4)
MCH RBC QN AUTO: 28.9 PG (ref 26.1–32.9)
MCHC RBC AUTO-ENTMCNC: 31.7 G/DL (ref 31.4–35)
MCV RBC AUTO: 91.2 FL (ref 82–102)
MONOCYTES # BLD: 0.6 K/UL (ref 0.1–1.3)
MONOCYTES NFR BLD: 6 % (ref 4–12)
NEUTS SEG # BLD: 5.8 K/UL (ref 1.7–8.2)
NEUTS SEG NFR BLD: 56 % (ref 43–78)
NITRITE UR QL STRIP.AUTO: NEGATIVE
NRBC # BLD: 0 K/UL (ref 0–0.2)
PH UR STRIP: 7 (ref 5–9)
PLATELET # BLD AUTO: 400 K/UL (ref 150–450)
PMV BLD AUTO: 10.2 FL (ref 9.4–12.3)
POTASSIUM SERPL-SCNC: 4.4 MMOL/L (ref 3.5–5.1)
PROT SERPL-MCNC: 7.9 G/DL (ref 6.3–8.2)
PROT UR STRIP-MCNC: 30 MG/DL
RBC # BLD AUTO: 5.23 M/UL (ref 4.23–5.6)
RBC #/AREA URNS HPF: ABNORMAL /HPF
SODIUM SERPL-SCNC: 137 MMOL/L (ref 133–143)
SP GR UR REFRACTOMETRY: 1.04 (ref 1–1.02)
T4 FREE SERPL-MCNC: 1 NG/DL (ref 0.78–1.46)
TROPONIN I SERPL HS-MCNC: 5.2 PG/ML (ref 0–14)
TSH W FREE THYROID IF ABNORMAL: 4.18 UIU/ML (ref 0.36–3.74)
UROBILINOGEN UR QL STRIP.AUTO: 1 EU/DL (ref 0.2–1)
WBC # BLD AUTO: 10.3 K/UL (ref 4.3–11.1)
WBC URNS QL MICRO: ABNORMAL /HPF

## 2023-03-04 PROCEDURE — 96374 THER/PROPH/DIAG INJ IV PUSH: CPT

## 2023-03-04 PROCEDURE — 85025 COMPLETE CBC W/AUTO DIFF WBC: CPT

## 2023-03-04 PROCEDURE — 85652 RBC SED RATE AUTOMATED: CPT

## 2023-03-04 PROCEDURE — 70498 CT ANGIOGRAPHY NECK: CPT

## 2023-03-04 PROCEDURE — 86140 C-REACTIVE PROTEIN: CPT

## 2023-03-04 PROCEDURE — 6370000000 HC RX 637 (ALT 250 FOR IP): Performed by: EMERGENCY MEDICINE

## 2023-03-04 PROCEDURE — 99285 EMERGENCY DEPT VISIT HI MDM: CPT

## 2023-03-04 PROCEDURE — 6360000002 HC RX W HCPCS: Performed by: EMERGENCY MEDICINE

## 2023-03-04 PROCEDURE — 85379 FIBRIN DEGRADATION QUANT: CPT

## 2023-03-04 PROCEDURE — 6360000004 HC RX CONTRAST MEDICATION: Performed by: EMERGENCY MEDICINE

## 2023-03-04 PROCEDURE — 84443 ASSAY THYROID STIM HORMONE: CPT

## 2023-03-04 PROCEDURE — 70450 CT HEAD/BRAIN W/O DYE: CPT

## 2023-03-04 PROCEDURE — 84439 ASSAY OF FREE THYROXINE: CPT

## 2023-03-04 PROCEDURE — 1100000003 HC PRIVATE W/ TELEMETRY

## 2023-03-04 PROCEDURE — 2580000003 HC RX 258: Performed by: EMERGENCY MEDICINE

## 2023-03-04 PROCEDURE — 84484 ASSAY OF TROPONIN QUANT: CPT

## 2023-03-04 PROCEDURE — 93005 ELECTROCARDIOGRAM TRACING: CPT | Performed by: EMERGENCY MEDICINE

## 2023-03-04 PROCEDURE — 84156 ASSAY OF PROTEIN URINE: CPT

## 2023-03-04 PROCEDURE — 81003 URINALYSIS AUTO W/O SCOPE: CPT

## 2023-03-04 PROCEDURE — 71045 X-RAY EXAM CHEST 1 VIEW: CPT

## 2023-03-04 PROCEDURE — 83735 ASSAY OF MAGNESIUM: CPT

## 2023-03-04 PROCEDURE — 94640 AIRWAY INHALATION TREATMENT: CPT

## 2023-03-04 PROCEDURE — 80053 COMPREHEN METABOLIC PANEL: CPT

## 2023-03-04 RX ORDER — ALBUTEROL SULFATE 90 UG/1
2 AEROSOL, METERED RESPIRATORY (INHALATION) 4 TIMES DAILY
Status: DISCONTINUED | OUTPATIENT
Start: 2023-03-05 | End: 2023-03-05 | Stop reason: HOSPADM

## 2023-03-04 RX ORDER — LABETALOL HYDROCHLORIDE 5 MG/ML
10 INJECTION, SOLUTION INTRAVENOUS EVERY 10 MIN PRN
Status: DISCONTINUED | OUTPATIENT
Start: 2023-03-04 | End: 2023-03-05 | Stop reason: HOSPADM

## 2023-03-04 RX ORDER — ACETAMINOPHEN 650 MG/1
650 SUPPOSITORY RECTAL EVERY 4 HOURS PRN
Status: DISCONTINUED | OUTPATIENT
Start: 2023-03-04 | End: 2023-03-05 | Stop reason: HOSPADM

## 2023-03-04 RX ORDER — ASPIRIN 81 MG/1
81 TABLET ORAL DAILY
Status: DISCONTINUED | OUTPATIENT
Start: 2023-03-05 | End: 2023-03-05 | Stop reason: HOSPADM

## 2023-03-04 RX ORDER — CITALOPRAM 10 MG/1
10 TABLET ORAL DAILY
Status: DISCONTINUED | OUTPATIENT
Start: 2023-03-05 | End: 2023-03-04

## 2023-03-04 RX ORDER — ROSUVASTATIN CALCIUM 20 MG/1
40 TABLET, COATED ORAL NIGHTLY
Status: DISCONTINUED | OUTPATIENT
Start: 2023-03-04 | End: 2023-03-05 | Stop reason: HOSPADM

## 2023-03-04 RX ORDER — ASPIRIN 300 MG/1
300 SUPPOSITORY RECTAL DAILY
Status: DISCONTINUED | OUTPATIENT
Start: 2023-03-05 | End: 2023-03-05 | Stop reason: HOSPADM

## 2023-03-04 RX ORDER — 0.9 % SODIUM CHLORIDE 0.9 %
100 INTRAVENOUS SOLUTION INTRAVENOUS
Status: DISCONTINUED | OUTPATIENT
Start: 2023-03-04 | End: 2023-03-05 | Stop reason: HOSPADM

## 2023-03-04 RX ORDER — 0.9 % SODIUM CHLORIDE 0.9 %
1000 INTRAVENOUS SOLUTION INTRAVENOUS ONCE
Status: COMPLETED | OUTPATIENT
Start: 2023-03-04 | End: 2023-03-04

## 2023-03-04 RX ORDER — PANTOPRAZOLE SODIUM 40 MG/1
40 TABLET, DELAYED RELEASE ORAL
Status: DISCONTINUED | OUTPATIENT
Start: 2023-03-05 | End: 2023-03-05 | Stop reason: HOSPADM

## 2023-03-04 RX ORDER — SODIUM CHLORIDE 0.9 % (FLUSH) 0.9 %
10 SYRINGE (ML) INJECTION
Status: DISCONTINUED | OUTPATIENT
Start: 2023-03-04 | End: 2023-03-05 | Stop reason: HOSPADM

## 2023-03-04 RX ORDER — METHYLPREDNISOLONE SODIUM SUCCINATE 125 MG/2ML
125 INJECTION, POWDER, LYOPHILIZED, FOR SOLUTION INTRAMUSCULAR; INTRAVENOUS
Status: COMPLETED | OUTPATIENT
Start: 2023-03-04 | End: 2023-03-04

## 2023-03-04 RX ORDER — POLYETHYLENE GLYCOL 3350 17 G/17G
17 POWDER, FOR SOLUTION ORAL DAILY PRN
Status: DISCONTINUED | OUTPATIENT
Start: 2023-03-04 | End: 2023-03-05 | Stop reason: HOSPADM

## 2023-03-04 RX ORDER — NICOTINE 21 MG/24HR
1 PATCH, TRANSDERMAL 24 HOURS TRANSDERMAL DAILY PRN
Status: DISCONTINUED | OUTPATIENT
Start: 2023-03-04 | End: 2023-03-05 | Stop reason: HOSPADM

## 2023-03-04 RX ORDER — BISACODYL 10 MG
10 SUPPOSITORY, RECTAL RECTAL DAILY PRN
Status: DISCONTINUED | OUTPATIENT
Start: 2023-03-04 | End: 2023-03-05 | Stop reason: HOSPADM

## 2023-03-04 RX ORDER — IPRATROPIUM BROMIDE AND ALBUTEROL SULFATE 2.5; .5 MG/3ML; MG/3ML
1 SOLUTION RESPIRATORY (INHALATION)
Status: COMPLETED | OUTPATIENT
Start: 2023-03-04 | End: 2023-03-04

## 2023-03-04 RX ORDER — ACETAMINOPHEN 325 MG/1
650 TABLET ORAL EVERY 4 HOURS PRN
Status: DISCONTINUED | OUTPATIENT
Start: 2023-03-04 | End: 2023-03-05 | Stop reason: HOSPADM

## 2023-03-04 RX ADMIN — SODIUM CHLORIDE 1000 ML: 9 INJECTION, SOLUTION INTRAVENOUS at 20:46

## 2023-03-04 RX ADMIN — IOPAMIDOL 100 ML: 755 INJECTION, SOLUTION INTRAVENOUS at 19:54

## 2023-03-04 RX ADMIN — IPRATROPIUM BROMIDE AND ALBUTEROL SULFATE 1 AMPULE: 2.5; .5 SOLUTION RESPIRATORY (INHALATION) at 19:30

## 2023-03-04 RX ADMIN — METHYLPREDNISOLONE SODIUM SUCCINATE 125 MG: 125 INJECTION, POWDER, FOR SOLUTION INTRAMUSCULAR; INTRAVENOUS at 19:13

## 2023-03-04 RX ADMIN — SODIUM CHLORIDE 1000 ML: 9 INJECTION, SOLUTION INTRAVENOUS at 19:12

## 2023-03-04 ASSESSMENT — ENCOUNTER SYMPTOMS
BACK PAIN: 0
DIARRHEA: 0
SHORTNESS OF BREATH: 1
NAUSEA: 0
VOMITING: 0
ABDOMINAL PAIN: 0
COUGH: 0
RHINORRHEA: 0

## 2023-03-04 ASSESSMENT — VISUAL ACUITY
OU: 20/40
OS: 20/40
OD: 20/50

## 2023-03-04 ASSESSMENT — LIFESTYLE VARIABLES
HOW OFTEN DO YOU HAVE A DRINK CONTAINING ALCOHOL: NEVER
HOW MANY STANDARD DRINKS CONTAINING ALCOHOL DO YOU HAVE ON A TYPICAL DAY: PATIENT DOES NOT DRINK

## 2023-03-04 ASSESSMENT — PAIN - FUNCTIONAL ASSESSMENT: PAIN_FUNCTIONAL_ASSESSMENT: NONE - DENIES PAIN

## 2023-03-04 NOTE — ED PROVIDER NOTES
Emergency Department Provider Note                   PCP:                Angella Samuels DO               Age: 41 y.o. Sex: male     DISPOSITION Admitted 03/04/2023 09:48:47 PM       ICD-10-CM    1. ROCIO (acute kidney injury) ([facility] [address]  N17.9       2. COPD exacerbation ([facility] [address]  J44.1       3. Transient vision disturbance  H53.9 Transthoracic echocardiogram (TTE) complete with contrast, bubble, strain, and 3D PRN     Transthoracic echocardiogram (TTE) complete with contrast, bubble, strain, and 3D PRN      4. Near syncope  R55       5. Hypoxia  R09.02           MEDICAL DECISION MAKING  Complexity of Problems Addressed:  1 or more chronic illnesses with a severe exacerbation or progression. 1 or more acute illnesses that pose a threat to life or bodily function. Data Reviewed and Analyzed:  Category 1:   I reviewed records from an external source: ED records from outside this hospital.  I reviewed records from an external source: provider visit notes from PCP. I reviewed records from an external source: provider visit notes from outside specialist.  I reviewed records from an external source: previous lab results from outside ED. I reviewed records from an external source: previous imaging studies from outside ED. I ordered each unique test.  I reviewed the results of each unique test.        Category 2:   I independently ordered and reviewed the EKG. I independently ordered and reviewed the X-rays. Chest x-ray with no acute cardiopulmonary process noted. I independently ordered and reviewed the CT Scan. CT head with no acute intracranial abnormality noted. CTA with no LVO or significant stenosis. Category 3: Discussion of management or test interpretation. 62-year-old male with history of COPD, right renal cyst, tobacco dependence, anxiety, GERD who presents status post near syncopal episode after bike riding with a friend.  Patient states that sat down after short bike ride and that \"his vision went white\" with diffuse generalized weakness. EMS reported that patient was hypotensive on scene. Stated they can initially not get a blood pressure on him. Initially would get a blood pressure of 80s over 40s. Patient was also noted to be hypoxic per EMS and was placed on 2 L O2 via nasal cannula. Differential diagnosis includes but is not limited to COPD exacerbation, orthostatic hypotension, dehydration, ROCIO, ACS, pulmonary embolism, UTI, CVA, etc.  ==============================================  On arrival vital signs stable. Afebrile. Normal neuro exam.  No focal deficits. Patient denies any vision loss or blurred vision on arrival.  EKG with normal sinus rhythm. Heart rate 84. No ST elevation. Initial troponin noted to be unremarkable. Chest x-ray with no acute cardiopulmonary process noted. Patient placed on 2 L O2 via nasal cannula which she would not keep in his nostrils and instead put in his mouth. DuoNeb, Solu-Medrol 125 mg IV ordered. 1 L normal saline IV fluid bolus given. Patient noted to have ROCIO with creatinine of 1.77. Patient denies active chest pressure at this time. D-dimer noted to be within normal limits. No indication for CT PE protocol. During ED course, patient with episode of intermittent vision disturbance bilaterally. No facial droop. No slurred speech. No focal deficits noted. Patient describes as \"everyone spaces turn white\". Stat Neurology consult placed. CT head ordered. CT head with no acute intracranial abnormality noted. CTA also obtained with no evidence of LVO or significant stenosis. Patient given additional 1 L normal saline IV fluid bolus. Neuro states that the transient visual disturbance likely due to hypotension/dehydration. No indication for tPA at this time. Neurology recommend admission and MRI, formal inpatient ophthalmology consultation. Patient denies eye pain. Denies tearing, foreign body sensation, ocular pressure.   Order placed for visual acuity to be obtained. Patient states he does wear glasses. Hospitalist consulted for admission. Discussed case with Dr. Aurora Calvert. ED Course as of 03/05/23 0239   Sat Mar 04, 2023   1828 Creatinine(!): 1.77 [DF]   1828 Troponin, High Sensitivity: 5.2 [DF]   1916 Portable Chest X-ray    FINDINGS: The cardiomediastinal contours are within normal limits. There are   surgical clips over the neck. The lungs and pleural spaces are clear. There is no pleural effusion or   pneumothorax. The visualized bones and upper abdomen are unremarkable. IMPRESSION:  1. No acute cardiopulmonary process identified. [DF]   2054 CT head/CTA head and neck    IMPRESSION:  1. No acute intracranial process. MRI is more sensitive for the detection of   acute nonhemorrhagic infarct. 2. No significant stenosis or occlusion of the major arteries of the head and   neck. 3. Emphysema. 4. Right hemithyroidectomy. [DF]      ED Course User Index  [DF] Efrain Damon MD       Risk of Complications and/or Morbidity of Patient Management:  Patient was admitted I have communication with admitting physician and Discussion with external consultants     Junior Fritz is a 41 y.o. male who presents to the Emergency Department with chief complaint of  near syncopal episode, SOB. Chief Complaint   Patient presents with    Shortness of Breath     Near syncopal episode      44-year-old male with history of GERD, COPD, tobacco use, anxiety presents via EMS status post near syncopal sewed after bike riding downtown. States that he had finished a short bike ride and was sitting down with a friend when he had near syncopal state. States that he noticed some substernal chest discomfort. Patient noted to have O2 sats around 90% on room air in route and was placed on 2 L. Patient denies cough, fever, chills, hemoptysis, abdominal pain.   Denies headache, focal weakness, numbness, tingling, slurred speech, seizure-like activity. Patient denies history of CAD, PE. Patient was noted to have initial blood pressure of 80s over 40s on scene with EMS. The history is provided by the patient. No  was used. Review of Systems   Constitutional:  Negative for chills, fatigue and fever. HENT:  Negative for congestion and rhinorrhea. Respiratory:  Positive for shortness of breath. Negative for cough. Cardiovascular:  Positive for chest pain. Negative for palpitations and leg swelling. Gastrointestinal:  Negative for abdominal pain, diarrhea, nausea and vomiting. Genitourinary:  Negative for dysuria and flank pain. Musculoskeletal:  Negative for arthralgias, back pain and myalgias. Skin:  Negative for rash and wound. Neurological:  Positive for syncope and light-headedness. Negative for seizures, facial asymmetry, speech difficulty, weakness, numbness and headaches. Hematological:  Does not bruise/bleed easily. Psychiatric/Behavioral:  Negative for confusion. Vitals signs and nursing note reviewed. Patient Vitals for the past 4 hrs:   Temp Pulse Resp BP SpO2   03/04/23 2300 98.2 °F (36.8 °C) 90 20 (!) 143/92 96 %   03/04/23 2246 98.5 °F (36.9 °C) -- -- -- --          Physical Exam  Vitals and nursing note reviewed. Constitutional:       Appearance: He is well-developed. HENT:      Head: Normocephalic. Eyes:      General: Vision grossly intact. Gaze aligned appropriately. Extraocular Movements: Extraocular movements intact. Pupils: Pupils are equal, round, and reactive to light. Comments: No nystagmus. PERRLA. EOMI. No evidence of afferent pupillary defect noted. Neck:      Vascular: No JVD. Cardiovascular:      Rate and Rhythm: Normal rate. Pulses: Normal pulses. Heart sounds: Normal heart sounds. Pulmonary:      Effort: Pulmonary effort is normal.      Breath sounds: Wheezing present.    Abdominal:      General: Bowel sounds are normal. Palpations: Abdomen is soft. Tenderness: There is no abdominal tenderness. There is no rebound. Comments: Soft, nontender, nondistended. No rebound or guarding. Musculoskeletal:         General: Normal range of motion. Cervical back: Normal range of motion. Right lower leg: No tenderness. No edema. Left lower leg: No tenderness. No edema. Comments: No pitting edema. No calf TTP. Skin:     General: Skin is warm. Findings: No erythema or rash. Neurological:      General: No focal deficit present. Mental Status: He is alert and oriented to person, place, and time. Motor: No weakness. Comments: Strength 5 out of 5 throughout. Normal sensory exam.  No drift. No facial droop. No dysarthria. EKG 12 Lead    Date/Time: 3/5/2023 2:25 AM  Performed by: Sande Prader., MD  Authorized by: Sande Prader., MD     ECG reviewed by ED Physician in the absence of a cardiologist: yes    Rate:     ECG rate:  84    ECG rate assessment: normal    Rhythm:     Rhythm: sinus rhythm    Ectopy:     Ectopy: none    QRS:     QRS axis:  Normal    QRS intervals:  Normal    QRS conduction: normal    ST segments:     ST segments:  Normal  T waves:     T waves: normal       Orders Placed This Encounter   Procedures    XR CHEST PORTABLE    CT HEAD WO CONTRAST    CTA HEAD NECK W WO CONTRAST    MRI brain without contrast    CMP    CBC with Auto Differential    Magnesium    Troponin    D-Dimer, Quantitative    Urinalysis w rflx microscopic    Sedimentation Rate    C-Reactive Protein    TSH with Reflex    Protein / creatinine ratio, urine    T4, Free    CBC    Basic Metabolic Panel w/ Reflex to MG    Hemoglobin A1c    Lipid Panel    Angiotensin Converting Enzyme    Cortisol, Baseline    Anti-DNA Antibody, Double-Stranded    Anti- Smith    Hepatic Function Panel    ADULT DIET;  Regular    Orthostatic Vital Signs    NIH STROKE SCALE ASSESSMENT    Nursing swallow assessment Visual acuity screening    Vital signs per unit routine    Vital signs    Telemetry monitoring - 48 hour duration    Up with assistance    NIHSS/Neuro Checks    Tobacco cessation education    Swallow screen by nursing before diet and oral medications started.     Stroke education    Place intermittent pneumatic compression device    Strict intake and output    Full code    Consult to Physical Medicine Rehab    Inpatient consult to Case Management    Inpatient consult to Ophthalmology    OT eval and treat    PT evaluation and treat    Initiate Oxygen Therapy Protocol    Speech Language Pathology (SLP) eval and treat    EKG 12 Lead    ADMIT TO INPATIENT        Medications   0.9 % sodium chloride bolus (has no administration in time range)   sodium chloride flush 0.9 % injection 10 mL (has no administration in time range)   pantoprazole (PROTONIX) tablet 40 mg (has no administration in time range)   nicotine (NICODERM CQ) 14 MG/24HR 1 patch (has no administration in time range)   acetaminophen (TYLENOL) tablet 650 mg (has no administration in time range)     Or   acetaminophen (TYLENOL) suppository 650 mg (has no administration in time range)   polyethylene glycol (GLYCOLAX) packet 17 g (has no administration in time range)   bisacodyl (DULCOLAX) suppository 10 mg (has no administration in time range)   aspirin EC tablet 81 mg (has no administration in time range)     Or   aspirin suppository 300 mg (has no administration in time range)   labetalol (NORMODYNE;TRANDATE) injection 10 mg (has no administration in time range)   rosuvastatin (CRESTOR) tablet 40 mg (40 mg Oral Given 3/5/23 0013)   fluticasone-umeclidin-vilant (TRELEGY ELLIPTA) 100-62.5-25 MCG/ACT inhaler 1 puff (Patient Supplied) (has no administration in time range)   albuterol sulfate HFA (PROVENTIL;VENTOLIN;PROAIR) 108 (90 Base) MCG/ACT inhaler 2 puff (has no administration in time range)   0.9 % sodium chloride bolus (0 mLs IntraVENous Stopped 3/4/23 2028)   ipratropium-albuterol (DUONEB) nebulizer solution 1 ampule (1 ampule Inhalation Given 3/4/23 1930)   methylPREDNISolone sodium (SOLU-MEDROL) injection 125 mg (125 mg IntraVENous Given 3/4/23 1913)   0.9 % sodium chloride bolus (1,000 mLs IntraVENous New Bag 3/4/23 2046)   iopamidol (ISOVUE-370) 76 % injection 100 mL (100 mLs IntraVENous Given 3/4/23 1954)   calcium carbonate (TUMS) chewable tablet 500 mg (500 mg Oral Given 3/5/23 0013)       Current Discharge Medication List           Past Medical History:   Diagnosis Date    Anxiety     Bell's palsy     Chronic obstructive pulmonary disease ([facility] [address]     Cloudy urine 4/22/2022    Depression     Diverticulosis     Elevated hemoglobin A1c 2/7/2023    Erectile dysfunction 6/3/2022    Fatigue     GERD (gastroesophageal reflux disease)     Gonorrhea 07/2019    Hemorrhoids     IBS (irritable bowel syndrome)     Ill-defined condition     fibromyalgia    Left ACL tear     Pre-diabetes     Psychiatric disorder     SLAP tear of shoulder     Thyroid disease     s/p partial thyroidectomy for goiter    Tobacco dependence 10/11/2021    Tumors     Urgency incontinence 4/22/2022    Urgency of urination 6/3/2022    Vision changes         Past Surgical History:   Procedure Laterality Date    ANTERIOR CRUCIATE LIGAMENT REPAIR      L shoulder    HEENT      IR BIOPSY THYROID PERC CORE NEEDLE  2007    HAS HALF THYROID    MASTECTOMY, BILATERAL      ORTHOPEDIC SURGERY      L shoulder         Family History   Problem Relation Age of Onset    Cancer Brother         VARIOUS TUMORS    No Known Problems Father     Heart Disease Mother     Depression Mother     Anxiety Disorder Mother     Cancer Mother     Kidney Disease Mother     Hypertension Mother     Diabetes Brother         Social History     Socioeconomic History    Marital status: Single   Tobacco Use    Smoking status: Every Day     Packs/day: 1.00     Years: 33.00     Pack years: 33.00     Types: Cigarettes    Smokeless tobacco: Never    Tobacco comments:     Decreased to 5 cigarettes    Vaping Use    Vaping Use: Some days    Substances: Nicotine, CBD    Devices: Pre-filled pod   Substance and Sexual Activity    Alcohol use: Not Currently    Drug use: No     Social Determinants of Health     Financial Resource Strain: Low Risk     Difficulty of Paying Living Expenses: Not hard at all   Food Insecurity: No Food Insecurity    Worried About Running Out of Food in the Last Year: Never true    Ran Out of Food in the Last Year: Never true   Transportation Needs: Unknown    Lack of Transportation (Non-Medical): No   Housing Stability: Unknown    Unstable Housing in the Last Year: No        Allergies: Levonorgestrel-ethinyl estrad    Current Discharge Medication List        CONTINUE these medications which have NOT CHANGED    Details   tadalafil (CIALIS) 10 MG tablet Take 1 tablet by mouth as needed for Erectile Dysfunction  Qty: 30 tablet, Refills: 1    Associated Diagnoses: Erectile dysfunction, unspecified erectile dysfunction type      albuterol sulfate HFA (PROVENTIL;VENTOLIN;PROAIR) 108 (90 Base) MCG/ACT inhaler Inhale 2 puffs into the lungs 4 times daily INHALE 1 PUFF BY MOUTH AS NEEDED FOR WHEEZING OR SHORTNESS OF BREATH  Qty: 18 g, Refills: 1    Associated Diagnoses: Pulmonary emphysema, unspecified emphysema type (HCC)      citalopram (CELEXA) 10 MG tablet Take 1 tablet by mouth daily  Qty: 30 tablet, Refills: 2    Associated Diagnoses: Anxiety; Fibromyalgia      fluticasone-umeclidin-vilant (TRELEGY ELLIPTA) 100-62.5-25 MCG/ACT AEPB inhaler Inhale 1 puff into the lungs daily  Qty: 1 each, Refills: 3    Associated Diagnoses: Pulmonary emphysema, unspecified emphysema type (HCC)      omeprazole (PRILOSEC) 40 MG delayed release capsule Take 1 capsule by mouth daily  Qty: 30 capsule, Refills: 2    Associated Diagnoses: Gastroesophageal reflux disease without esophagitis      aspirin 325 MG tablet Take 325 mg by mouth daily      dicyclomine (BENTYL) 10 MG capsule Take 10 mg by mouth 4 times daily (before meals and nightly)              Results for orders placed or performed during the hospital encounter of 03/04/23   XR CHEST PORTABLE    Narrative    EXAM:  XR CHEST PORTABLE ,  3/4/2023 6:00 PM    INDICATION:  Chest pain. COMPARISON:  None. FINDINGS: The cardiomediastinal contours are within normal limits. There are   surgical clips over the neck. The lungs and pleural spaces are clear. There is no pleural effusion or   pneumothorax. The visualized bones and upper abdomen are unremarkable. Impression    1. No acute cardiopulmonary process identified. This exam was interpreted by a board-certified, body-imaging fellowship trained   radiologist from [address] [place] [address]. If there are any questions regarding   this examination please feel free to contact a radiologist at [phone]. [address]     Armin Hernandez   3/4/2023 6:32:00 PM   CT HEAD WO CONTRAST    Narrative    CT HEAD WITHOUT CONTRAST. CTA HEAD WITH CONTRAST. CTA NECK WITH CONTRAST. DATE: 3/4/2023 7:43 PM     INDICATION:  Intermittent vision loss. Dizziness     COMPARISON: None. TECHNIQUE:  Axial images through the head without contrast. Thin section axial   images through the head and neck after IV contrast. Two dimensional and three   dimensional reformations. NASCET criteria used to determine stenosis. 100 mL   Isovue 370 given IV. Low-dose CT acquisition technique included one of the   following options: 1. Automated exposure control, 2. Adjustment of mA and/or KV   according to patient's size and/or 3. Use of iterative reconstruction. FINDINGS:      CT HEAD:    The ventricles and cisternal spaces are normal in size, shape and configuration   for a patient of this age. Trevor Dunks white differentiation is preserved. No dominant   mass, midline shift, hydrocephalus, extra-axial fluid collection or acute   hemorrhage.       No asymmetric hyperdensity of the proximal major cerebral arteries. Craniocervical junction is patent. Mild mucosal thickening ethmoid air cells, maxillary sinuses. Otherwise,   Visualized paranasal sinuses and mastoid air cells are clear. Skull is intact. Visualized intraorbital contents are unremarkable. CTA NECK:    Aortic arch: Normal triple vessel anatomy. Right common carotid artery: Normal origin. No significant stenosis, occlusion. Right internal carotid artery: No significant stenosis, occlusion. Right vertebral artery: Normal origin. No significant stenosis, occlusion. Left common carotid artery: Normal origin. No significant stenosis, occlusion. Left internal carotid artery: No significant stenosis, occlusion. Left vertebral artery: Normal origin. No significant stenosis, occlusion. Parotid and submandibular glands are intact. Posterior nasopharynx, oropharynx,   hypopharynx, larynx, proximal trachea, imaged superior mediastinum are patent. Lymph nodes in the neck do not meet the CT size criteria for lymphadenopathy. Degenerative changes of the spine. Mild centrilobular and paraseptal emphysema   in the lung apices. Dependent atelectasis in the lung apices. Right   hemithyroidectomy. CTA HEAD:    No large aneurysm, significant stenosis or occlusion of the proximal major   cerebral arteries. Major dural venous sinuses are patent. Impression    1. No acute intracranial process. MRI is more sensitive for the detection of   acute nonhemorrhagic infarct. 2. No significant stenosis or occlusion of the major arteries of the head and   neck. 3. Emphysema. 4. Right hemithyroidectomy. Deisy Bang M.D.   3/4/2023 8:39:00 PM   CTA HEAD NECK W WO CONTRAST    Narrative    CT HEAD WITHOUT CONTRAST. CTA HEAD WITH CONTRAST. CTA NECK WITH CONTRAST. DATE: 3/4/2023 7:43 PM     INDICATION:  Intermittent vision loss. Dizziness     COMPARISON: None.      TECHNIQUE:  Axial images through the head without contrast. Thin section axial   images through the head and neck after IV contrast. Two dimensional and three   dimensional reformations. NASCET criteria used to determine stenosis. 100 mL   Isovue 370 given IV. Low-dose CT acquisition technique included one of the   following options: 1. Automated exposure control, 2. Adjustment of mA and/or KV   according to patient's size and/or 3. Use of iterative reconstruction. FINDINGS:      CT HEAD:    The ventricles and cisternal spaces are normal in size, shape and configuration   for a patient of this age. Terra Milder white differentiation is preserved. No dominant   mass, midline shift, hydrocephalus, extra-axial fluid collection or acute   hemorrhage. No asymmetric hyperdensity of the proximal major cerebral arteries. Craniocervical junction is patent. Mild mucosal thickening ethmoid air cells, maxillary sinuses. Otherwise,   Visualized paranasal sinuses and mastoid air cells are clear. Skull is intact. Visualized intraorbital contents are unremarkable. CTA NECK:    Aortic arch: Normal triple vessel anatomy. Right common carotid artery: Normal origin. No significant stenosis, occlusion. Right internal carotid artery: No significant stenosis, occlusion. Right vertebral artery: Normal origin. No significant stenosis, occlusion. Left common carotid artery: Normal origin. No significant stenosis, occlusion. Left internal carotid artery: No significant stenosis, occlusion. Left vertebral artery: Normal origin. No significant stenosis, occlusion. Parotid and submandibular glands are intact. Posterior nasopharynx, oropharynx,   hypopharynx, larynx, proximal trachea, imaged superior mediastinum are patent. Lymph nodes in the neck do not meet the CT size criteria for lymphadenopathy. Degenerative changes of the spine. Mild centrilobular and paraseptal emphysema   in the lung apices. Dependent atelectasis in the lung apices.  Right   hemithyroidectomy.    CTA HEAD:    No large aneurysm, significant stenosis or occlusion of the proximal major   cerebral arteries.     Major dural venous sinuses are patent.      Impression    1. No acute intracranial process. MRI is more sensitive for the detection of   acute nonhemorrhagic infarct.  2. No significant stenosis or occlusion of the major arteries of the head and   neck.  3. Emphysema.  4. Right hemithyroidectomy.     Wolfgang Nova M.D.   3/4/2023 8:39:00 PM   CMP   Result Value Ref Range    Sodium 137 133 - 143 mmol/L    Potassium 4.4 3.5 - 5.1 mmol/L    Chloride 105 101 - 110 mmol/L    CO2 24 21 - 32 mmol/L    Anion Gap 8 2 - 11 mmol/L    Glucose 143 (H) 65 - 100 mg/dL    BUN 14 6 - 23 MG/DL    Creatinine 1.77 (H) 0.8 - 1.5 MG/DL    Est, Glom Filt Rate 48 (L) >60 ml/min/1.73m2    Calcium 9.3 8.3 - 10.4 MG/DL    Total Bilirubin 1.0 0.2 - 1.1 MG/DL    ALT 40 12 - 65 U/L    AST 24 15 - 37 U/L    Alk Phosphatase 158 (H) 50 - 136 U/L    Total Protein 7.9 6.3 - 8.2 g/dL    Albumin 3.9 3.5 - 5.0 g/dL    Globulin 4.0 2.8 - 4.5 g/dL    Albumin/Globulin Ratio 1.0 0.4 - 1.6     CBC with Auto Differential   Result Value Ref Range    WBC 10.3 4.3 - 11.1 K/uL    RBC 5.23 4.23 - 5.6 M/uL    Hemoglobin 15.1 13.6 - 17.2 g/dL    Hematocrit 47.7 41.1 - 50.3 %    MCV 91.2 82.0 - 102.0 FL    MCH 28.9 26.1 - 32.9 PG    MCHC 31.7 31.4 - 35.0 g/dL    RDW 13.8 11.9 - 14.6 %    Platelets 400 150 - 450 K/uL    MPV 10.2 9.4 - 12.3 FL    nRBC 0.00 0.0 - 0.2 K/uL    Differential Type AUTOMATED      Seg Neutrophils 56 43 - 78 %    Lymphocytes 32 13 - 44 %    Monocytes 6 4.0 - 12.0 %    Eosinophils % 4 0.5 - 7.8 %    Basophils 1 0.0 - 2.0 %    Immature Granulocytes 1 0.0 - 5.0 %    Segs Absolute 5.8 1.7 - 8.2 K/UL    Absolute Lymph # 3.2 0.5 - 4.6 K/UL    Absolute Mono # 0.6 0.1 - 1.3 K/UL    Absolute Eos # 0.4 0.0 - 0.8 K/UL    Basophils Absolute 0.1 0.0 - 0.2 K/UL    Absolute Immature Granulocyte 0.1 0.0 - 0.5 K/UL  Magnesium   Result Value Ref Range    Magnesium 2.4 1.8 - 2.4 mg/dL   Troponin   Result Value Ref Range    Troponin, High Sensitivity 5.2 0 - 14 pg/mL   D-Dimer, Quantitative   Result Value Ref Range    D-Dimer, Quant <0.27 <0.56 ug/ml(FEU)   Urinalysis w rflx microscopic   Result Value Ref Range    Color, UA YELLOW      Appearance CLOUDY      Specific Gravity, UA 1.039 (H) 1.001 - 1.023      pH, Urine 7.0 5.0 - 9.0      Protein, UA 30 (A) NEG mg/dL    Glucose, UA Negative NEG mg/dL    Ketones, Urine Negative NEG mg/dL    Bilirubin Urine Negative NEG      Blood, Urine Negative NEG      Urobilinogen, Urine 1.0 0.2 - 1.0 EU/dL    Nitrite, Urine Negative NEG      Leukocyte Esterase, Urine SMALL (A) NEG      WBC, UA 5-10 0 /hpf    RBC, UA 0-3 0 /hpf    Epithelial Cells UA 0-3 0 /hpf    BACTERIA, URINE 0 0 /hpf    Casts 10-20 0 /lpf    Crystals CA OXALATE 0 /LPF   Sedimentation Rate   Result Value Ref Range    Sed Rate, Automated 4 0 - 20 mm/hr   C-Reactive Protein   Result Value Ref Range    CRP 0.5 0.0 - 0.9 mg/dL   TSH with Reflex   Result Value Ref Range    TSH w Free Thyroid if Abnormal 4.18 (H) 0.358 - 3.740 UIU/ML   T4, Free   Result Value Ref Range    T4 Free 1.0 0.78 - 1.46 NG/DL        CT HEAD WO CONTRAST   Final Result   1. No acute intracranial process. MRI is more sensitive for the detection of    acute nonhemorrhagic infarct. 2. No significant stenosis or occlusion of the major arteries of the head and    neck. 3. Emphysema. 4. Right hemithyroidectomy. Beena Escobar M.D.    3/4/2023 8:39:00 PM      CTA HEAD NECK W WO CONTRAST   Final Result   1. No acute intracranial process. MRI is more sensitive for the detection of    acute nonhemorrhagic infarct. 2. No significant stenosis or occlusion of the major arteries of the head and    neck. 3. Emphysema. 4. Right hemithyroidectomy. Beena Escobar M.D.    3/4/2023 8:39:00 PM      XR CHEST PORTABLE   Final Result      1.   No acute cardiopulmonary process identified. This exam was interpreted by a board-certified, body-imaging fellowship trained    radiologist from [address] [place] [address]. If there are any questions regarding    this examination please feel free to contact a radiologist at [phone]. Elba Yoo    3/4/2023 6:32:00 PM      MRI brain without contrast    (Results Pending)        NIH Stroke Scale  Interval: Baseline  Level of Consciousness (1a): Alert  LOC Questions (1b): Answers both correctly  LOC Commands (1c): Performs both tasks correctly  Best Gaze (2): Normal  Visual (3): No visual loss  Facial Palsy (4): Normal symmetrical movement  Motor Arm, Left (5a): No drift  Motor Arm, Right (5b): No drift  Motor Leg, Left (6a): No drift  Motor Leg, Right (6b): No drift  Limb Ataxia (7): Absent  Sensory (8): Normal  Best Language (9): No aphasia  Dysarthria (10): Normal  Extinction and Inattention (11): No abnormality  Total: 0            Voice dictation software was used during the making of this note. This software is not perfect and grammatical and other typographical errors may be present. This note has not been completely proofread for errors.      Armin Santos MD  03/05/23 7998

## 2023-03-05 ENCOUNTER — APPOINTMENT (OUTPATIENT)
Dept: MRI IMAGING | Age: 48
DRG: 683 | End: 2023-03-05
Payer: MEDICARE

## 2023-03-05 VITALS
HEIGHT: 73 IN | HEART RATE: 79 BPM | RESPIRATION RATE: 18 BRPM | DIASTOLIC BLOOD PRESSURE: 64 MMHG | SYSTOLIC BLOOD PRESSURE: 124 MMHG | OXYGEN SATURATION: 96 % | BODY MASS INDEX: 25.58 KG/M2 | TEMPERATURE: 97.5 F | WEIGHT: 193 LBS

## 2023-03-05 PROBLEM — N17.9 AKI (ACUTE KIDNEY INJURY) (HCC): Status: RESOLVED | Noted: 2023-03-04 | Resolved: 2023-03-05

## 2023-03-05 PROBLEM — I95.9 HYPOTENSION: Status: RESOLVED | Noted: 2023-03-04 | Resolved: 2023-03-05

## 2023-03-05 LAB
ALBUMIN SERPL-MCNC: 3.3 G/DL (ref 3.5–5)
ALBUMIN/GLOB SERPL: 0.9 (ref 0.4–1.6)
ALP SERPL-CCNC: 130 U/L (ref 50–136)
ALT SERPL-CCNC: 32 U/L (ref 12–65)
ANION GAP SERPL CALC-SCNC: 4 MMOL/L (ref 2–11)
AST SERPL-CCNC: 15 U/L (ref 15–37)
BILIRUB DIRECT SERPL-MCNC: 0.2 MG/DL
BILIRUB SERPL-MCNC: 0.6 MG/DL (ref 0.2–1.1)
BUN SERPL-MCNC: 17 MG/DL (ref 6–23)
CALCIUM SERPL-MCNC: 9 MG/DL (ref 8.3–10.4)
CHLORIDE SERPL-SCNC: 109 MMOL/L (ref 101–110)
CHOLEST SERPL-MCNC: 162 MG/DL
CO2 SERPL-SCNC: 24 MMOL/L (ref 21–32)
CORTIS BS SERPL-MCNC: 7.4 UG/DL
CREAT SERPL-MCNC: 1.15 MG/DL (ref 0.8–1.5)
CREAT UR-MCNC: 170 MG/DL
ERYTHROCYTE [DISTWIDTH] IN BLOOD BY AUTOMATED COUNT: 13.7 % (ref 11.9–14.6)
EST. AVERAGE GLUCOSE BLD GHB EST-MCNC: 94 MG/DL
GLOBULIN SER CALC-MCNC: 3.7 G/DL (ref 2.8–4.5)
GLUCOSE SERPL-MCNC: 149 MG/DL (ref 65–100)
HBA1C MFR BLD: 4.9 % (ref 4.8–5.6)
HCT VFR BLD AUTO: 42.8 % (ref 41.1–50.3)
HDLC SERPL-MCNC: 44 MG/DL (ref 40–60)
HDLC SERPL: 3.7
HGB BLD-MCNC: 13.6 G/DL (ref 13.6–17.2)
LDLC SERPL CALC-MCNC: 107 MG/DL
MCH RBC QN AUTO: 28.8 PG (ref 26.1–32.9)
MCHC RBC AUTO-ENTMCNC: 31.8 G/DL (ref 31.4–35)
MCV RBC AUTO: 90.7 FL (ref 82–102)
NRBC # BLD: 0 K/UL (ref 0–0.2)
PLATELET # BLD AUTO: 324 K/UL (ref 150–450)
PMV BLD AUTO: 10.3 FL (ref 9.4–12.3)
POTASSIUM SERPL-SCNC: 4.7 MMOL/L (ref 3.5–5.1)
PROT SERPL-MCNC: 7 G/DL (ref 6.3–8.2)
PROT UR-MCNC: 31 MG/DL
PROT/CREAT UR-RTO: 0.2
RBC # BLD AUTO: 4.72 M/UL (ref 4.23–5.6)
SODIUM SERPL-SCNC: 137 MMOL/L (ref 133–143)
TRIGL SERPL-MCNC: 55 MG/DL (ref 35–150)
VLDLC SERPL CALC-MCNC: 11 MG/DL (ref 6–23)
WBC # BLD AUTO: 12.2 K/UL (ref 4.3–11.1)

## 2023-03-05 PROCEDURE — 86235 NUCLEAR ANTIGEN ANTIBODY: CPT

## 2023-03-05 PROCEDURE — 94760 N-INVAS EAR/PLS OXIMETRY 1: CPT

## 2023-03-05 PROCEDURE — 94761 N-INVAS EAR/PLS OXIMETRY MLT: CPT

## 2023-03-05 PROCEDURE — 80048 BASIC METABOLIC PNL TOTAL CA: CPT

## 2023-03-05 PROCEDURE — 6370000000 HC RX 637 (ALT 250 FOR IP): Performed by: FAMILY MEDICINE

## 2023-03-05 PROCEDURE — 97161 PT EVAL LOW COMPLEX 20 MIN: CPT

## 2023-03-05 PROCEDURE — 85027 COMPLETE CBC AUTOMATED: CPT

## 2023-03-05 PROCEDURE — 94640 AIRWAY INHALATION TREATMENT: CPT

## 2023-03-05 PROCEDURE — 97165 OT EVAL LOW COMPLEX 30 MIN: CPT

## 2023-03-05 PROCEDURE — 82533 TOTAL CORTISOL: CPT

## 2023-03-05 PROCEDURE — 80076 HEPATIC FUNCTION PANEL: CPT

## 2023-03-05 PROCEDURE — 36415 COLL VENOUS BLD VENIPUNCTURE: CPT

## 2023-03-05 PROCEDURE — 70551 MRI BRAIN STEM W/O DYE: CPT

## 2023-03-05 PROCEDURE — 86225 DNA ANTIBODY NATIVE: CPT

## 2023-03-05 PROCEDURE — 80061 LIPID PANEL: CPT

## 2023-03-05 PROCEDURE — 92610 EVALUATE SWALLOWING FUNCTION: CPT

## 2023-03-05 PROCEDURE — 83036 HEMOGLOBIN GLYCOSYLATED A1C: CPT

## 2023-03-05 PROCEDURE — 82164 ANGIOTENSIN I ENZYME TEST: CPT

## 2023-03-05 PROCEDURE — 97530 THERAPEUTIC ACTIVITIES: CPT

## 2023-03-05 RX ORDER — CALCIUM CARBONATE 200(500)MG
500 TABLET,CHEWABLE ORAL ONCE
Status: COMPLETED | OUTPATIENT
Start: 2023-03-05 | End: 2023-03-05

## 2023-03-05 RX ORDER — NITROFURANTOIN 25; 75 MG/1; MG/1
100 CAPSULE ORAL 2 TIMES DAILY
Qty: 20 CAPSULE | Refills: 0 | Status: SHIPPED | OUTPATIENT
Start: 2023-03-05 | End: 2023-03-15

## 2023-03-05 RX ORDER — NITROFURANTOIN MACROCRYSTALS 100 MG/1
100 CAPSULE ORAL EVERY 12 HOURS SCHEDULED
Qty: 20 CAPSULE | Refills: 0 | Status: SHIPPED | OUTPATIENT
Start: 2023-03-05 | End: 2023-03-05 | Stop reason: SDUPTHER

## 2023-03-05 RX ORDER — ASPIRIN 81 MG/1
81 TABLET ORAL DAILY
Qty: 30 TABLET | Refills: 3 | COMMUNITY
Start: 2023-03-06

## 2023-03-05 RX ORDER — NITROFURANTOIN MACROCRYSTALS 100 MG/1
100 CAPSULE ORAL EVERY 12 HOURS SCHEDULED
Qty: 20 CAPSULE | Refills: 0 | Status: SHIPPED | OUTPATIENT
Start: 2023-03-05 | End: 2023-03-05 | Stop reason: HOSPADM

## 2023-03-05 RX ORDER — NITROFURANTOIN MACROCRYSTALS 50 MG/1
100 CAPSULE ORAL EVERY 12 HOURS SCHEDULED
Status: DISCONTINUED | OUTPATIENT
Start: 2023-03-05 | End: 2023-03-05 | Stop reason: HOSPADM

## 2023-03-05 RX ADMIN — ALBUTEROL SULFATE 2 PUFF: 108 INHALANT RESPIRATORY (INHALATION) at 11:18

## 2023-03-05 RX ADMIN — ANTACID TABLETS 500 MG: 500 TABLET, CHEWABLE ORAL at 00:13

## 2023-03-05 RX ADMIN — ROSUVASTATIN 40 MG: 20 TABLET, FILM COATED ORAL at 00:13

## 2023-03-05 RX ADMIN — ALBUTEROL SULFATE 2 PUFF: 108 INHALANT RESPIRATORY (INHALATION) at 07:57

## 2023-03-05 ASSESSMENT — VISUAL ACUITY: OU: 1

## 2023-03-05 NOTE — PROGRESS NOTES
SPEECH LANGUAGE PATHOLOGY: DYSPHAGIA  Initial Assessment and Discharge    NAME: Jb Cooney  : 1975  MRN: [id number]    ADMISSION DATE: 3/4/2023  PRIMARY DIAGNOSIS: ROCIO (acute kidney injury) ([facility] [address]  COPD exacerbation (HCC) [J44.1]  Transient vision disturbance [H53.9]  ROCIO (acute kidney injury) ([facility] [address] [N17.9]  Near syncope [R55]    ICD-10: Treatment Diagnosis: R13.11 Dysphagia, Oral Phase    RECOMMENDATIONS   Diet:  Diet Solids Recommendation: Regular  Liquid Consistency Recommendation: Thin    Medications: Whole with water           Patient continues to require skilled intervention: No  D/C Recommendations: No follow up therapy recommended post discharge     ASSESSMENT    Dysphagia Diagnosis: Swallow function appears WFL  Dysphagia Impression : Pt tolerated thin via cup and straw, pureed, mixed and solid with no overt signs/sx of aspiration. Recommend continued diet of regular/thin. medications with water. Speech clear. MRI negative. No further ST indicated. GENERAL    History of Present Injury/Illness: Mr. Jose D Cuevas  has a past medical history of Anxiety, Bell's palsy, Chronic obstructive pulmonary disease ([facility] [address] Cloudy urine, Depression, Diverticulosis, Elevated hemoglobin A1c, Erectile dysfunction, Fatigue, GERD (gastroesophageal reflux disease), Gonorrhea, Hemorrhoids, IBS (irritable bowel syndrome), Ill-defined condition, Left ACL tear, Pre-diabetes, Psychiatric disorder, SLAP tear of shoulder, Thyroid disease, Tobacco dependence, Tumors, Urgency incontinence, Urgency of urination, and Vision changes. Margarita Cano He also  has a past surgical history that includes Mastectomy, bilateral; IR BIOPSY THYROID PERC CORE NEEDLE (); heent; Anterior cruciate ligament repair; and orthopedic surgery.     Chart Reviewed: Yes  Behavior/Cognition: Alert  Patient Position: upright in bed  Communication Observation: Functional  Follows Directions: Simple    Prior Dysphagia History: none     Current Diet : Regular  Current Liquid Diet : Thin          O2 Device: None (Room air)        Pain:   Patient does not c/o pain                   Vision: Within Functional Limits            Hearing: Within functional limits       OBJECTIVE    Patient Positioning: Upright in bed   Oral Motor   Labial: No impairment  Dentition: Natural  Oral Hygiene: Clean  Oral Hygiene Comments: adequate  Lingual: No impairment  Mandible: No impairment  Dentition: Adequate        Baseline Vocal Quality: Normal    Oropharyngeal Phase:     Assessment Method(s): Observation  Patient Position: upright in bed  Vocal Quality: No Impairment  Consistency Presented: Thin;Pureed;Regular;Mixed consistency  How Presented: Self-fed/presented;Straw;Spoon  Bolus Acceptance: No impairment  Bolus Formation/Control: No impairment  Propulsion: No impairment  Oral Residue: None  Initiation of Swallow: No impairment  Aspiration Signs/Symptoms: None  Pharyngeal Phase Characteristics: No impairment, issues, or problems        Oral Phase - Comment: WFL  Pharyngeal Phase: Pt with no overt signs/sx of aspiration with presented trials. PLAN    Duration/Frequency: No treatment indicated at this time    Dysphagia Outcome and Severity Scale (LUIS ALBERTO)  Dysphagia Outcome Severity Scale: Level 7: Normal in all situations  Interpretation of Tool: The Dysphagia Outcome and Severity Scale (LUIS ALBERTO) is a simple, easy-to-use, 7-point scale developed to systematically rate the functional severity of dysphagia based on objective assessment and make recommendations for diet level, independence level, and type of nutrition.    Normal(7), Functional(6), Mild(5), Mild-Moderate(4), Moderate(3), Moderate-Severe(2), Severe(1)         Education:    Patient Education: dysphagia, diet, positioning  Patient Education Response: Verbalizes understanding    PRECAUTIONS/ALLERGIES: Levonorgestrel-ethinyl estrad   Safety Devices in place: Yes  Type of devices: Nurse notified;Call light within reach        Therapy Time  SLP Individual Minutes  Time In: 2217  Time Out: Kiran Albert  Minutes: [address] [place], [place]  3/5/2023 12:37 PM

## 2023-03-05 NOTE — PROGRESS NOTES
TRANSFER - IN REPORT:    Verbal report received from [facility] 109 on Dominique Tubbs  being received from [facility] for routine progression of patient care      Report consisted of patient's Situation, Background, Assessment and   Recommendations(SBAR). Information from the following report(s) Nurse Handoff Report was reviewed with the receiving nurse. Opportunity for questions and clarification was provided. Assessment completed upon patient's arrival to unit and care assumed.
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
## 2023-03-05 NOTE — PROGRESS NOTES
Discharge instructions reviewed with Pt. Opportunity for questions and clarification given. IV removed and cath tip intact. Scripts sent to pharmacy / sent with pt. Education given to pt and pt was able to teach back. No needs at this time and pt waiting on ride to arrive.

## 2023-03-05 NOTE — PROGRESS NOTES
PHYSICAL THERAPY Initial Assessment, Daily Note, and Discharge  (Link to Caseload Tracking: PT Visit Days : 1  Acknowledge Orders  Time In/Out  PT Charge Capture  Rehab Caseload Tracker    María Elena Serrano is a 41 y.o. male   PRIMARY DIAGNOSIS: ROCIO (acute kidney injury) ([facility]  [facility] [J44.1]  Transient vision disturbance [H53.9]  ROCIO (acute kidney injury) ([facility] [N17.9]  Near syncope [R55]       Reason for Referral:   Dizziness and Giddiness (R42)  Inpatient: Payor: [place] / Plan: Grayson Rocha PPO / Product Type: Medicare /     ASSESSMENT:     REHAB RECOMMENDATIONS:   Recommendation to date pending progress:  Setting:  No further skilled therapy after discharge from hospital    Equipment:    None     ASSESSMENT:  Mr. Cyril Dimas is a 41 y.o. male admitted with syncope. Upon PT evaluation, pt demonstrates independence with all functional mobility and is a low fall risk per Tinetti. Orthostatic BP negative. Pt is cleared to return home from PT perspective. No further inpatient PT needs. [employer] [facility] 6 Clicks Basic Mobility Inpatient Short Form  AM-PAC Basic Mobility - Inpatient   How much help is needed turning from your back to your side while in a flat bed without using bedrails?: None  How much help is needed moving from lying on your back to sitting on the side of a flat bed without using bedrails?: None  How much help is needed moving to and from a bed to a chair?: None  How much help is needed standing up from a chair using your arms?: None  How much help is needed walking in hospital room?: None  How much help is needed climbing 3-5 steps with a railing?: None  [facility] Inpatient Mobility Raw Score : 24  AM-PAC Inpatient T-Scale Score : 61.14  Mobility Inpatient CMS 0-100% Score: 0  Mobility Inpatient CMS G-Code Modifier : CH    SUBJECTIVE:   Mr. Cyril Dimas states, \"I'm ready to go home. \"     Social/Functional ADL Assistance: Independent  Homemaking Assistance: Independent  Ambulation Assistance: Independent  Transfer Assistance: Independent  Active : Yes    OBJECTIVE:     PAIN: Tyrel Schulz / O2: PRECAUTION / Lester Alvarez / Mayito Fernandez:   Pre Treatment:   Pain Assessment: None - Denies Pain      Post Treatment: 0 Vitals   Vitals  Orthostatic B/P and Pulse?: Yes  Blood Pressure Lyin/91  Blood Pressure Sittin/98  Blood Pressure Standin/102    Oxygen      IV    RESTRICTIONS/PRECAUTIONS:                    GROSS EVALUATION: Intact Impaired (Comments):   AROM [x]     PROM [x]    Strength [x]     Balance [x] Low fall risk per Tinetti   Posture [x]    Sensation [x]     Coordination [x]      Tone [x]     Edema [x]    Activity Tolerance [x]      []      COGNITION/  PERCEPTION: Intact Impaired (Comments):   Orientation [x]     Vision [x]     Hearing [x]     Cognition  [x]         MOBILITY: I Mod I S SBA CGA Min Mod Max Total  NT x2 Comments:   Bed Mobility    Rolling [x] [] [] [] [] [] [] [] [] [] []    Supine to Sit [x] [] [] [] [] [] [] [] [] [] []    Scooting [x] [] [] [] [] [] [] [] [] [] []    Sit to Supine [x] [] [] [] [] [] [] [] [] [] []    Transfers    Sit to Stand [x] [] [] [] [] [] [] [] [] [] []    Bed to Chair [x] [] [] [] [] [] [] [] [] [] []    Stand to Sit [x] [] [] [] [] [] [] [] [] [] []     [] [] [] [] [] [] [] [] [] [] []    I=Independent, Mod I=Modified Independent, S=Supervision, SBA=Standby Assistance, CGA=Contact Guard Assistance,   Min=Minimal Assistance, Mod=Moderate Assistance, Max=Maximal Assistance, Total=Total Assistance, NT=Not Tested    GAIT: I Mod I S SBA CGA Min Mod Max Total  NT x2 Comments:   Level of Assistance [x] [] [] [] [] [] [] [] [] [] []    Distance 150 feet    DME None    Gait Quality N/A    Weightbearing Status      Stairs      I=Independent, Mod I=Modified Independent, S=Supervision, SBA=Standby Assistance, CGA=Contact Guard Assistance,   Min=Minimal Assistance, Mod=Moderate Assistance, Max=Maximal Assistance, Total=Total Assistance, NT=Not Tested    PLAN:   ACUTE PHYSICAL THERAPY GOALS:   (Developed with and agreed upon by patient and/or caregiver.)  Pt will verbalize understanding of PT education points without cues. - MET    FREQUENCY AND DURATION: Eval/DC. No further PT needs. THERAPY PROGNOSIS: Good    PROBLEM LIST:   (Skilled intervention is medically necessary to address:)  Limited knowledge of inpatient mobility needs. INTERVENTIONS PLANNED:   (Benefits and precautions of physical therapy have been discussed with the patient.)  Education       TREATMENT:   EVALUATION: LOW COMPLEXITY: (Untimed Charge)    TREATMENT:   Therapeutic Activity (8 Minutes): Therapeutic activity included Supine to Sit, Transfer Training, Ambulation on level ground, Sitting balance , Standing balance, and education  to improve functional Activity tolerance, Mobility, and safe discharge . TREATMENT GRID:  N/A    AFTER TREATMENT PRECAUTIONS: Bed, Bed/Chair Locked, Call light within reach, Needs within reach, and RN notified    INTERDISCIPLINARY COLLABORATION:  RN/ PCT, PT/ PTA, and OT/ GLEASON    EDUCATION: Education Given To: Patient  Education Provided: Role of Therapy;Plan of Care; Fall Prevention Strategies; Energy Conservation;Transfer Training  Education Method: Demonstration;Verbal  Barriers to Learning: None  Education Outcome: Verbalized understanding;Demonstrated understanding    TIME IN/OUT:  Time In: 1012  Time Out: [address]  Minutes: 41 Mamta Pepper PT

## 2023-03-05 NOTE — ED NOTES
TRANSFER - OUT REPORT:    Verbal report given to Lindsay Bates  being transferred to [facility] for routine progression of patient care       Report consisted of patient's Situation, Background, Assessment and   Recommendations(SBAR).     Information from the following report(s) Nurse Handoff Report was reviewed with the receiving nurse.  Williams Assessment: Presents to emergency department  because of falls (Syncope, seizure, or loss of consciousness): No, Age > 70: No, Altered Mental Status, Intoxication with alcohol or substance confusion (Disorientation, impaired judgment, poor safety awaremess, or inability to follow instructions): No, Impaired Mobility: Ambulates or transfers with assistive devices or assistance; Unable to ambulate or transer.: No  Lines:   Peripheral IV 03/04/23 Left Antecubital (Active)        Opportunity for questions and clarification was provided.      Patient transported with:  Registered Nurse          Emir Carranza RN  03/04/23 2555

## 2023-03-05 NOTE — ED NOTES
Patient complaining of not being able to really focus. Peoples faces are white and everything is bright. MD notified.       Hernando Jenkins RN  03/04/23 Kate Wray RN  03/04/23 8023
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
## 2023-03-05 NOTE — DISCHARGE INSTRUCTIONS
Please follow-up with your PCP in regards to your thyroid and repeating labs for that. Please follow-up with urology to make sure your urine is not refluxing into your kidneys    Please take your full course of antibiotics.

## 2023-03-05 NOTE — CARE COORDINATION
SW met with patient who confirmed demographic information, states that he lives with a roommate, has friends/family for support, and is seen by Dr. Jb Otoole for primary care. Patient does not use assistive devices to ambulate, denies any falls, and is independent at home. Patient denies any deficits and feels that his balance and strength are at baseline. Patient evaluated by therapy, no post discharge needs noted. ASSESSMENT NOTE    Attending Physician: Olivia Mae,   Admit Problem: [facility] [J44.1]  Transient vision disturbance [H53.9]  ROCIO (acute kidney injury) ([facility] [N17.9]  Near syncope [R55]  Date/Time of Admission: 3/4/2023  5:46 PM  Problem List:  Patient Active Problem List   Diagnosis    Diverticulosis    Polyarthralgia    Anxiety    GERD (gastroesophageal reflux disease)    Chronic obstructive pulmonary disease (HCC)    Tobacco dependence    Erectile dysfunction    Elevated hemoglobin A1c    Simple cyst of kidney       Service Assessment  Patient Orientation Self, Situation, Place, Person, Alert and Oriented   Cognition Alert   History Provided By Patient   Primary Caregiver Self   Accompanied By/Relationship     Support Systems Family Members, Friends/Neighbors   Patient's Healthcare Decision Maker is: Legal Next of Freda 71   PCP Verified by CM Yes   Last Visit to PCP     Prior Functional Level Independent in ADLs/IADLs   Current Functional Level Independent in ADLs/IADLs   Can patient return to prior living arrangement Yes   Ability to make needs known: Good   Family able to assist with home care needs: Yes   Would you like for me to discuss the discharge plan with any other family members/significant others, and if so, who?      Financial Resources Medicaid, [employer] None   CM/PEG Referral       Social/Functional History  Lives With Other (comment) (roommate)   [facility] [address]   [facility] to enter without rails   Entrance Stairs - Number of Steps 2   Entrance Stairs - Rails     Bathroom Shower/Tub Tub/Shower unit   Bathroom Toilet     [address] [facility] Help From     ADL Assistance Independent   Bath     Dressing     Grooming     Feeding     Toileting     [address] Work     Driving     Shopping          Other (Comment)     Homemaking Responsibilities     Meal Prep Responsibility     Laundry Responsibility     Cleaning Responsibility     Bill Paying/Finance Responsibility     [address] Management     Other (Comment)     Ambulation Assistance Independent   Transfer Assistance Independent   Active  Yes   Patient's  Info     Mode of Transportation     Education     Occupation     Type of Occupation       Discharge Planning   Type of [address] [facility] Family Members, Friends/Neighbors   Current Services Prior To Admission     Potential Assistance Needed     DME     DME     DME Ordered? Potential Assistance Purchasing Medications     Meds-to-Beds: Does the patient want to have any new prescriptions delivered to bedside prior to discharge? Type of Home Care Services     Patient expects to be discharged to: Apartment   Follow Up Appointment: Best Day/Time     One/Two Story Residence:     # of Interior Steps     Height of Each Step (in)     [employer] Available     History of Falls? Services At/After Discharge  Transition of Care Consult (CM Consult): Internal Home Health     Internal Hospice     Reason Outside Agency [address]     Partner SNF     Reason Why Partner SNF Not Chosen     Internal Comfort Care     Reason Outside [address] Discharge     [address] Provided?      Mode of Transport at [facility] Time of Discharge     Confirm Follow Up Transport       Condition of Participation: Discharge Planning  The plan for Transition of Care is related to the following treatment goals: The Patient and/or Patient Representative was provided with a Choice of Provider? Name of the Patient Representative who was provided with the Choice of Provider and agrees with the Discharge Plan? The Patient and/or Patient Representative Agree with the Discharge Plan? Freedom of Choice list was provided with basic dialogue that supports the individualized plan of care/goals, treatment preferences, and shares the quality data associated with the providers?        Documentation for Discharge Appeal  Discharge Appealed by     Date notified by [facility] of appeal request:     Time notified by [facility] of appeal request:     Detailed Notice of Discharge given to:     Date Notice of Discharge given:     Time Notice of Discharge given:     Date records sent to [facility]IO     Time records sent to Araceli Auguste     Date Notified of Outcome     Time Notified of Outcome     Outcome of appeal           OLMAN Ureña 03/05/23 11:06 AM    [address] [facility], [address] [place] [address] Work   [address] [facility]

## 2023-03-05 NOTE — H&P
Hospitalist Admission History and Physical         NAME:            Virginia Manning    Age:                41 y.o.    :               1975    MRN:              [id number]    PCP: Jeanne Salas DO    Consulting MD:    Treatment Team: Attending Provider: Isidro Brooks DO; Registered Nurse: Isaac Avery RN         Chief Complaint   Patient presents with    Shortness of Breath     Near syncopal episode   HPI:    Patient is a 41 y.o. male who presented to the ED for cc near syncope today after riding his bike with a friend. EMS found BP to be 80s/40s. He admits to only eating a piece of cake this AM. Also had visual disturbances that were described as blurry and as if images had a bright light. CT head with no acute process. CTA head and neck with no large vessel occlusion. Hx of anxiety, COPD, s/p right hemithyroidectomy, right renal cyst and bilateral small renal lesions followed by urology, GI bleeding, and tobacco abuse.     Creatine 1.7 from baseline 1.3,   Past Medical History:   Diagnosis Date    Anxiety     Bell's palsy     Chronic obstructive pulmonary disease (HCC)     Cloudy urine 2022    Depression     Diverticulosis     Elevated hemoglobin A1c 2023    Erectile dysfunction 6/3/2022    Fatigue     GERD (gastroesophageal reflux disease)     Gonorrhea 2019    Hemorrhoids     IBS (irritable bowel syndrome)     Ill-defined condition     fibromyalgia    Left ACL tear     Pre-diabetes     Psychiatric disorder     SLAP tear of shoulder     Thyroid disease     s/p partial thyroidectomy for goiter    Tobacco dependence 10/11/2021    Tumors     Urgency incontinence 2022    Urgency of urination 6/3/2022    Vision changes             Past Surgical History:   Procedure Laterality Date    ANTERIOR CRUCIATE LIGAMENT REPAIR      L shoulder    HEENT      IR BIOPSY THYROID PERC CORE NEEDLE      HAS HALF THYROID    MASTECTOMY, BILATERAL      ORTHOPEDIC SURGERY      L shoulder Family History   Problem Relation Age of Onset    Cancer Brother         VARIOUS TUMORS    No Known Problems Father     Heart Disease Mother     Depression Mother     Anxiety Disorder Mother     Cancer Mother     Kidney Disease Mother     Hypertension Mother     Diabetes Brother        Family history reviewed and negative except as noted above. Social History     Social History Narrative    Not on file            Social History     Tobacco Use    Smoking status: Every Day     Packs/day: 1.00     Years: 33.00     Pack years: 33.00     Types: Cigarettes    Smokeless tobacco: Never    Tobacco comments:     Decreased to 5 cigarettes    Substance Use Topics    Alcohol use: Not Currently            Social History     Substance and Sexual Activity   Drug Use No                 Allergies   Allergen Reactions    Levonorgestrel-Ethinyl Estrad Other (See Comments)     IS SEASONAL ALLERGIES, NOT \"SEASONALE\" MEDICATION!!            Prior to Admission medications    Medication Sig Start Date End Date Taking?  Authorizing Provider   tadalafil (CIALIS) 10 MG tablet Take 1 tablet by mouth as needed for Erectile Dysfunction 2/20/23   Yaima Shah, APRN - CNP   albuterol sulfate HFA (PROVENTIL;VENTOLIN;PROAIR) 108 (90 Base) MCG/ACT inhaler Inhale 2 puffs into the lungs 4 times daily INHALE 1 PUFF BY MOUTH AS NEEDED FOR WHEEZING OR SHORTNESS OF BREATH 2/7/23   Angella Samuels, DO   citalopram (CELEXA) 10 MG tablet Take 1 tablet by mouth daily 2/7/23 5/8/23  Angella Samuels, DO   fluticasone-umeclidin-vilant (TRELEGY ELLIPTA) 537-07.4-91 MCG/ACT AEPB inhaler Inhale 1 puff into the lungs daily 2/7/23   Deandre Rain, DO   omeprazole (PRILOSEC) 40 MG delayed release capsule Take 1 capsule by mouth daily 6/3/22   Angella Samuels DO   aspirin 325 MG tablet Take 325 mg by mouth daily  Patient not taking: No sig reported    Ar Automatic Reconciliation   dicyclomine (BENTYL) 10 MG capsule Take 10 mg by mouth 4 times daily (before meals and nightly)  Patient not taking: No sig reported    Ar Automatic Reconciliation                      Review of Systems         Constitutional: anxious     Eyes:  blurry vision     Ears, nose, mouth, throat, and face: no  Odynphagia, dysphagia, no thrush or exudate, negative for chronic sinus congestion, recurrent headaches    Respiratory: negative for SOB, hemoptysis or cough    Cardiovascular: negative for CP, palpitations, or PND    Gastrointestinal: intermittent GI bleeding     Genitourinary: dysuria     Integument/breast: negative for skin rash or skin lesions    Hematologic/lymphatic: negative for known bleeding disorder    Musculoskeletal:polyarthralgia     Neurological: near syncope, dizziness    Behavioral/Psych: chronic anxiety,    Endocrine: negative for polydyspia, polyuria or intolerance to heat or cold    Allergic/Immunologic: negative for chronic allergic rhinitis, or known connective tissue disorder              Objective:         Patient Vitals for the past 24 hrs:   Temp Pulse Resp BP SpO2   03/04/23 1934 -- 74 10 -- 96 %   03/04/23 1922 -- 81 14 110/82 93 %   03/04/23 1759 -- -- -- 107/79 91 %   03/04/23 1753 99.6 °F (37.6 °C) 87 20 111/79 91 %            03/04 1901 - 03/05 0700  In: 1000   Out: -     No intake/output data recorded.          Data Review:   Recent Results (from the past 24 hour(s))   CMP    Collection Time: 03/04/23  5:57 PM   Result Value Ref Range    Sodium 137 133 - 143 mmol/L    Potassium 4.4 3.5 - 5.1 mmol/L    Chloride 105 101 - 110 mmol/L    CO2 24 21 - 32 mmol/L    Anion Gap 8 2 - 11 mmol/L    Glucose 143 (H) 65 - 100 mg/dL    BUN 14 6 - 23 MG/DL    Creatinine 1.77 (H) 0.8 - 1.5 MG/DL    Est, Glom Filt Rate 48 (L) >60 ml/min/1.73m2    Calcium 9.3 8.3 - 10.4 MG/DL    Total Bilirubin 1.0 0.2 - 1.1 MG/DL    ALT 40 12 - 65 U/L    AST 24 15 - 37 U/L    Alk Phosphatase 158 (H) 50 - 136 U/L    Total Protein 7.9 6.3 - 8.2 g/dL    Albumin 3.9 3.5 - 5.0 g/dL    Globulin 4.0 2.8 - 4.5 g/dL    Albumin/Globulin Ratio 1.0 0.4 - 1.6     CBC with Auto Differential    Collection Time: 03/04/23  5:57 PM   Result Value Ref Range    WBC 10.3 4.3 - 11.1 K/uL    RBC 5.23 4.23 - 5.6 M/uL    Hemoglobin 15.1 13.6 - 17.2 g/dL    Hematocrit 47.7 41.1 - 50.3 %    MCV 91.2 82.0 - 102.0 FL    MCH 28.9 26.1 - 32.9 PG    MCHC 31.7 31.4 - 35.0 g/dL    RDW 13.8 11.9 - 14.6 %    Platelets 149 423 - 900 K/uL    MPV 10.2 9.4 - 12.3 FL    nRBC 0.00 0.0 - 0.2 K/uL    Differential Type AUTOMATED      Seg Neutrophils 56 43 - 78 %    Lymphocytes 32 13 - 44 %    Monocytes 6 4.0 - 12.0 %    Eosinophils % 4 0.5 - 7.8 %    Basophils 1 0.0 - 2.0 %    Immature Granulocytes 1 0.0 - 5.0 %    Segs Absolute 5.8 1.7 - 8.2 K/UL    Absolute Lymph # 3.2 0.5 - 4.6 K/UL    Absolute Mono # 0.6 0.1 - 1.3 K/UL    Absolute Eos # 0.4 0.0 - 0.8 K/UL    Basophils Absolute 0.1 0.0 - 0.2 K/UL    Absolute Immature Granulocyte 0.1 0.0 - 0.5 K/UL   Magnesium    Collection Time: 03/04/23  5:57 PM   Result Value Ref Range    Magnesium 2.4 1.8 - 2.4 mg/dL   Troponin    Collection Time: 03/04/23  5:57 PM   Result Value Ref Range    Troponin, High Sensitivity 5.2 0 - 14 pg/mL   D-Dimer, Quantitative    Collection Time: 03/04/23  5:57 PM   Result Value Ref Range    D-Dimer, Quant <0.27 <0.56 ug/ml(FEU)   Sedimentation Rate    Collection Time: 03/04/23  5:57 PM   Result Value Ref Range    Sed Rate, Automated 4 0 - 20 mm/hr   Urinalysis w rflx microscopic    Collection Time: 03/04/23  8:53 PM   Result Value Ref Range    Color, UA YELLOW      Appearance CLOUDY      Specific Gravity, UA 1.039 (H) 1.001 - 1.023      pH, Urine 7.0 5.0 - 9.0      Protein, UA 30 (A) NEG mg/dL    Glucose, UA Negative NEG mg/dL    Ketones, Urine Negative NEG mg/dL    Bilirubin Urine Negative NEG      Blood, Urine Negative NEG      Urobilinogen, Urine 1.0 0.2 - 1.0 EU/dL    Nitrite, Urine Negative NEG      Leukocyte Esterase, Urine SMALL (A) NEG      WBC, UA 5-10 0 /hpf    RBC, UA 0-3 0 /hpf    Epithelial Cells UA 0-3 0 /hpf    BACTERIA, URINE 0 0 /hpf    Casts 10-20 0 /lpf    Crystals CA OXALATE 0 /LPF            Physical Exam:         General:    Alert, cooperative, anxious, diaphoretic     Eyes:    Conjunctivae/corneas clear. PERRL, no nystagmus    Ears:    Normal     Nose:    Nares normal.     Mouth/Throat:    Lips, mucosa, and tongue normal. Teeth and gums normal. No tongue deviation or facial droop     Neck:    no JVD. Back:     deferred    Lungs:     Clear to auscultation bilaterally. Heart:    Regular rate and rhythm, S1, S2 normal    Abdomen:     Soft, non-tender. Bowel sounds normal.     Extremities:    5/5 strength to UE and LE bilaterally     Skin:    No malar rash, no obvious photosensitivity to skin     Neurologic:    CNII-XII intact. Normal strength, sensation and reflexes throughout. Assessment and Plan         Principal Problem:    ROCIO (acute kidney injury) ([address]  Active Problems:    Simple cyst of kidney    Hypotension    Diverticulosis    Visual disturbance    Polyarthralgia    Anxiety    GERD (gastroesophageal reflux disease)    Chronic obstructive pulmonary disease (HCC)    Tobacco dependence  Resolved Problems:    * No resolved hospital problems. *    Visual disturbances with near syncope and dizziness - Keep over night to ensure no CVA. CVA work up. ASA/statin. Tele neuro wanted inpatient ophthalmology to see to which I will order. ROCIO - UA pending. Suspecting due to his hypotension earlier. IV fluids to ensure BP stable. Repeat levels in AM. If worsening, I have a low threshold to contact nephrology. Strict Is and Os. Urine protein/creatine ratio    Hypotension - Check AM cortisol. TSH pending. No further episodes since here. Anxiety - stopped taking Celexa. Polyarthralgia with joint pain and positive SHAI - He admits to family hx of Lupus. UA is pending that will show us if any proteinuria. Will check ESR and CRP.  Order dsDNA, ACE level, and nathan antigen. He likely would benefit from seeing Rheumatology if these levels are +    2.1cm simple right renal cyst with tiny sub-centimeter renal lesions too small to characterize - Sees urology. COPD - No evidence of hypoxia while I am in the room. Lungs normal on exam. Continue home inhalers. Needs to stop smoking--he declines nicotine patch    Rectal bleeding with known diverticulosis - Has a colonoscopy scheduled for next week. No obvious GI abnormality from CT on 2/3/23 other than diverticulosis. Unfortunately, he has a lot of stressors going on at this time. Will need close follow up with PCP.      DVT prophylaxis - SCDs    Signed By:    Ephraim Martinez DO    March 4, 2023

## 2023-03-05 NOTE — PROGRESS NOTES
Reviewed notes for new spiritual concerns      Will continue to assess how we can best serve this family        [place].       Per notes:       LOCAL    CONTACT =  [facility]    FULL CODE    HAS ACP    MY CHART IS ACTIVE

## 2023-03-06 ENCOUNTER — TELEPHONE (OUTPATIENT)
Dept: INTERNAL MEDICINE CLINIC | Facility: CLINIC | Age: 48
End: 2023-03-06

## 2023-03-07 LAB
ACE SERPL-CCNC: 28 U/L (ref 14–82)
DSDNA AB SER-ACNC: <1 IU/ML (ref 0–9)
ENA SM AB SER-ACNC: <0.2 AI (ref 0–0.9)

## 2023-03-21 DIAGNOSIS — J43.9 PULMONARY EMPHYSEMA, UNSPECIFIED EMPHYSEMA TYPE (HCC): ICD-10-CM

## 2023-03-21 RX ORDER — ALBUTEROL SULFATE 90 UG/1
AEROSOL, METERED RESPIRATORY (INHALATION)
Qty: 18 EACH | Refills: 1 | OUTPATIENT
Start: 2023-03-21

## 2023-05-08 ENCOUNTER — OFFICE VISIT (OUTPATIENT)
Dept: INTERNAL MEDICINE CLINIC | Facility: CLINIC | Age: 48
End: 2023-05-08
Payer: MEDICARE

## 2023-05-08 VITALS
HEIGHT: 73 IN | HEART RATE: 89 BPM | WEIGHT: 198 LBS | SYSTOLIC BLOOD PRESSURE: 119 MMHG | OXYGEN SATURATION: 94 % | TEMPERATURE: 98.6 F | BODY MASS INDEX: 26.24 KG/M2 | DIASTOLIC BLOOD PRESSURE: 80 MMHG | RESPIRATION RATE: 14 BRPM

## 2023-05-08 DIAGNOSIS — J43.9 PULMONARY EMPHYSEMA, UNSPECIFIED EMPHYSEMA TYPE (HCC): ICD-10-CM

## 2023-05-08 DIAGNOSIS — J44.9 CHRONIC OBSTRUCTIVE PULMONARY DISEASE, UNSPECIFIED COPD TYPE (HCC): Primary | ICD-10-CM

## 2023-05-08 DIAGNOSIS — Z79.899 OTHER LONG TERM (CURRENT) DRUG THERAPY: ICD-10-CM

## 2023-05-08 DIAGNOSIS — K21.9 GASTROESOPHAGEAL REFLUX DISEASE WITHOUT ESOPHAGITIS: ICD-10-CM

## 2023-05-08 DIAGNOSIS — K64.8 INTERNAL HEMORRHOIDS: ICD-10-CM

## 2023-05-08 PROCEDURE — 99214 OFFICE O/P EST MOD 30 MIN: CPT | Performed by: INTERNAL MEDICINE

## 2023-05-08 RX ORDER — HYDROCORTISONE ACETATE 25 MG/1
25 SUPPOSITORY RECTAL EVERY 12 HOURS
Qty: 60 SUPPOSITORY | Refills: 0 | Status: SHIPPED | OUTPATIENT
Start: 2023-05-08 | End: 2023-05-10 | Stop reason: SDUPTHER

## 2023-05-08 RX ORDER — OMEPRAZOLE 40 MG/1
40 CAPSULE, DELAYED RELEASE ORAL DAILY
Qty: 30 CAPSULE | Refills: 2 | Status: SHIPPED | OUTPATIENT
Start: 2023-05-08 | End: 2023-05-10 | Stop reason: SDUPTHER

## 2023-05-08 RX ORDER — ALBUTEROL SULFATE 90 UG/1
2 AEROSOL, METERED RESPIRATORY (INHALATION) 4 TIMES DAILY
Qty: 18 G | Refills: 1 | Status: SHIPPED | OUTPATIENT
Start: 2023-05-08 | End: 2023-05-10 | Stop reason: SDUPTHER

## 2023-05-08 SDOH — ECONOMIC STABILITY: FOOD INSECURITY: WITHIN THE PAST 12 MONTHS, THE FOOD YOU BOUGHT JUST DIDN'T LAST AND YOU DIDN'T HAVE MONEY TO GET MORE.: NEVER TRUE

## 2023-05-08 SDOH — ECONOMIC STABILITY: INCOME INSECURITY: HOW HARD IS IT FOR YOU TO PAY FOR THE VERY BASICS LIKE FOOD, HOUSING, MEDICAL CARE, AND HEATING?: NOT HARD AT ALL

## 2023-05-08 SDOH — ECONOMIC STABILITY: FOOD INSECURITY: WITHIN THE PAST 12 MONTHS, YOU WORRIED THAT YOUR FOOD WOULD RUN OUT BEFORE YOU GOT MONEY TO BUY MORE.: NEVER TRUE

## 2023-05-08 ASSESSMENT — ANXIETY QUESTIONNAIRES
6. BECOMING EASILY ANNOYED OR IRRITABLE: 0
2. NOT BEING ABLE TO STOP OR CONTROL WORRYING: 0
GAD7 TOTAL SCORE: 0
3. WORRYING TOO MUCH ABOUT DIFFERENT THINGS: 0
1. FEELING NERVOUS, ANXIOUS, OR ON EDGE: 0
4. TROUBLE RELAXING: 0
5. BEING SO RESTLESS THAT IT IS HARD TO SIT STILL: 0
7. FEELING AFRAID AS IF SOMETHING AWFUL MIGHT HAPPEN: 0
IF YOU CHECKED OFF ANY PROBLEMS ON THIS QUESTIONNAIRE, HOW DIFFICULT HAVE THESE PROBLEMS MADE IT FOR YOU TO DO YOUR WORK, TAKE CARE OF THINGS AT HOME, OR GET ALONG WITH OTHER PEOPLE: NOT DIFFICULT AT ALL

## 2023-05-08 ASSESSMENT — PATIENT HEALTH QUESTIONNAIRE - PHQ9
SUM OF ALL RESPONSES TO PHQ9 QUESTIONS 1 & 2: 0
1. LITTLE INTEREST OR PLEASURE IN DOING THINGS: 0
SUM OF ALL RESPONSES TO PHQ QUESTIONS 1-9: 0
2. FEELING DOWN, DEPRESSED OR HOPELESS: 0

## 2023-05-08 NOTE — PROGRESS NOTES
Ayse Milan D.O. [facility]  [address], [address] [address]  Tel: [phone]    Office Visit: Follow Up     Patient Name: Georgiana Velasquez   :  1975   MRN:   [id number]      Today's Date: 23 7:23 AM    Subjective     The patient is a 57y.o.-year-old male who presents for follow-up. Anxiety  -he is not taking his Celexa, he doesn't like the side effects  -he states this is well controlled      Polyarthralgia/joint pain/positive SHAI  -Referrals: For Dr. Fannie Rossi at last visit but [place] [facility] does not take Medicaid so a referral to [facility] rheumatology was placed.  -He has not heard from them yet. Acid reflux  -Omeprazole 40 mg daily     Erectile dysfunction  -Cialis 10 mg daily as needed     COPD  -On Albuterol inhaler using daily   -Prescription given for nebulizer machine  -We will also give him Trelegy     High risk homosexual behavior     Simple kidney cyst  -Patient saw urology on 2023 and a CT renal mass protocol was ordered. Rectal bleeding, resolved; rectal pain with defecation  -Patient had colonoscopy on 3/7/2023 with Dr. Cole Jovel. Findings were significant for colon polyps, tattoos from prior intervention, left-sided diverticulosis, internal hemorrhoids, otherwise normal exam.  At this time Dr. Cole Jovel suspected his chronic symptoms are secondary to IBS-D. Patient was given a prescription for ciprofloxacin 500 mg twice daily plus metronidazole 5 mg every 8 hours for 10 days and hydrocortisone cream suppository to be used once per rectum twice a day for 7 to 10 days. Tobacco Dependence  -He states he is down to about 5 cigarettes per day but he is not ready to completely quit just yet     Numbness and tingling in his left upper leg  -Nerve conduction study  -Neurology referral     Diverticulosis    Today:   Patient went to the emergency room on 3/4/2023 for near syncope after riding his bike.   His blood pressure at the time was found

## 2023-05-09 LAB
ANION GAP SERPL CALC-SCNC: 4 MMOL/L (ref 2–11)
BUN SERPL-MCNC: 13 MG/DL (ref 6–23)
CALCIUM SERPL-MCNC: 9.6 MG/DL (ref 8.3–10.4)
CHLORIDE SERPL-SCNC: 109 MMOL/L (ref 101–110)
CO2 SERPL-SCNC: 26 MMOL/L (ref 21–32)
CREAT SERPL-MCNC: 1.2 MG/DL (ref 0.8–1.5)
GLUCOSE SERPL-MCNC: 132 MG/DL (ref 65–100)
POTASSIUM SERPL-SCNC: 4.5 MMOL/L (ref 3.5–5.1)
SODIUM SERPL-SCNC: 139 MMOL/L (ref 133–143)

## 2023-05-10 DIAGNOSIS — J44.9 CHRONIC OBSTRUCTIVE PULMONARY DISEASE, UNSPECIFIED COPD TYPE (HCC): ICD-10-CM

## 2023-05-10 DIAGNOSIS — K21.9 GASTROESOPHAGEAL REFLUX DISEASE WITHOUT ESOPHAGITIS: ICD-10-CM

## 2023-05-10 DIAGNOSIS — K64.8 INTERNAL HEMORRHOIDS: ICD-10-CM

## 2023-05-10 DIAGNOSIS — J43.9 PULMONARY EMPHYSEMA, UNSPECIFIED EMPHYSEMA TYPE (HCC): ICD-10-CM

## 2023-05-10 RX ORDER — ALBUTEROL SULFATE 90 UG/1
2 AEROSOL, METERED RESPIRATORY (INHALATION) 4 TIMES DAILY
Qty: 18 G | Refills: 1 | Status: SHIPPED | OUTPATIENT
Start: 2023-05-10

## 2023-05-10 RX ORDER — OMEPRAZOLE 40 MG/1
40 CAPSULE, DELAYED RELEASE ORAL DAILY
Qty: 30 CAPSULE | Refills: 2 | Status: SHIPPED | OUTPATIENT
Start: 2023-05-10

## 2023-05-10 RX ORDER — HYDROCORTISONE ACETATE 25 MG/1
25 SUPPOSITORY RECTAL EVERY 12 HOURS
Qty: 60 SUPPOSITORY | Refills: 0 | Status: SHIPPED | OUTPATIENT
Start: 2023-05-10 | End: 2023-06-09

## 2023-05-10 NOTE — TELEPHONE ENCOUNTER
Pt called and stated that he was here on 05/08/2023 and these pend medication was sent to [facility] pt wants to update and have his medications sent to [facility] in [place].  Pharmacy is now updated and ready to re send to the preferred pharmacy

## 2023-05-25 ENCOUNTER — TELEPHONE (OUTPATIENT)
Dept: INTERNAL MEDICINE CLINIC | Facility: CLINIC | Age: 48
End: 2023-05-25

## 2023-05-25 NOTE — TELEPHONE ENCOUNTER
Mail (test results from 5/11/23) was returned, no such street and unable to forward. Address given by the patient was used.

## 2023-08-08 ENCOUNTER — TELEPHONE (OUTPATIENT)
Dept: INTERNAL MEDICINE CLINIC | Facility: CLINIC | Age: 48
End: 2023-08-08

## 2023-08-08 NOTE — TELEPHONE ENCOUNTER
Attempt to notify patient of missed appointment with Piotr Cole was unsuccessful, voicemail to return call was made so the appointment can be rescheduled. I will also reach out to patient via [facility] if applicable to reschedule missed appointment.     No Show letter will be sent

## 2023-09-06 ENCOUNTER — TELEPHONE (OUTPATIENT)
Dept: INTERNAL MEDICINE CLINIC | Facility: CLINIC | Age: 48
End: 2023-09-06

## 2023-09-06 NOTE — TELEPHONE ENCOUNTER
Mail returned 9/5/23. Insufficient address, unable to forward. No such street. Used address given by pt, unable to locate alternate address.